# Patient Record
Sex: FEMALE | Race: WHITE | NOT HISPANIC OR LATINO | Employment: FULL TIME | URBAN - METROPOLITAN AREA
[De-identification: names, ages, dates, MRNs, and addresses within clinical notes are randomized per-mention and may not be internally consistent; named-entity substitution may affect disease eponyms.]

---

## 2017-07-13 ENCOUNTER — OFFICE VISIT (OUTPATIENT)
Dept: OBSTETRICS AND GYNECOLOGY | Facility: CLINIC | Age: 32
End: 2017-07-13

## 2017-07-13 VITALS
BODY MASS INDEX: 28.41 KG/M2 | WEIGHT: 181 LBS | DIASTOLIC BLOOD PRESSURE: 70 MMHG | SYSTOLIC BLOOD PRESSURE: 122 MMHG | HEIGHT: 67 IN

## 2017-07-13 DIAGNOSIS — N92.6 IRREGULAR MENSES: ICD-10-CM

## 2017-07-13 DIAGNOSIS — N39.3 SUI (STRESS URINARY INCONTINENCE, FEMALE): ICD-10-CM

## 2017-07-13 DIAGNOSIS — N94.10 DYSPAREUNIA, FEMALE: ICD-10-CM

## 2017-07-13 DIAGNOSIS — Z01.419 WOMEN'S ANNUAL ROUTINE GYNECOLOGICAL EXAMINATION: Primary | ICD-10-CM

## 2017-07-13 PROBLEM — F17.210 LIGHT SMOKER: Status: ACTIVE | Noted: 2017-07-13

## 2017-07-13 PROBLEM — Z30.2 ENCOUNTER FOR ESSURE IMPLANTATION: Status: ACTIVE | Noted: 2017-07-13

## 2017-07-13 PROCEDURE — 99395 PREV VISIT EST AGE 18-39: CPT | Performed by: OBSTETRICS & GYNECOLOGY

## 2017-07-13 NOTE — PROGRESS NOTES
"Subjective   Chief Complaint   Patient presents with   • Gynecologic Exam     no sex drive   • Genital Warts     3   • Bladder Prolapse     Meagan Davis is a 32 y.o. year old  presenting to be seen for her annual exam.    Current birth control method: Essure. Also decreased libido but it hurts    Patient's last menstrual period was 2017.    She is sexually active.   Condoms are not typically used.  Pain with sex - midline and she feels something ? Bladder dropping - she also has TACHO since last child, was a \"tinkle\" now gush with cough laugh or sneeze. NO Kegel's    Past 6 month menstrual history:    Cycle Frequency: vary between 25 and 28 days   Menstrual cycle character: flow is typically moderately heavy   Cycle Duration: 4 - 5   Number of heavy days of flows: 4   Intermenstrual bleeding present: {yes or early ; if early in month then she bleeds at the kate of the month   Post-coital bleeding present: no     She exercises regularly: no.take scare of Grandmother past year and quit last night due to anxiety palpations  Calcium intake: no.  She performs self breast exam:yes.  She has concerns about domestic violence: no.    The following portions of the patient's history were reviewed and updated as appropriate:problem list, current medications, allergies, past family history, past medical history, past social history and past surgical history.    Review of Systems    normal bladder and bowels  Objective   /70  Ht 66.75\" (169.5 cm)  Wt 181 lb (82.1 kg)  LMP 2017 Comment: irreg cycles  BMI 28.56 kg/m2     General:  well developed; well nourished  no acute distress  appears stated age   Skin:  No suspicious lesions seen   Thyroid: not examined   Breasts:  Examined in supine position  Symmetric without masses or skin dimpling  Nipples normal without inversion, lesions or discharge  There are no palpable axillary nodes   Abdomen: soft, non-tender; no masses  no umbilical or inginual hernias " are present  no hepato-splenomegaly  slightly tender lower quadrants   Pelvis: Clinical staff was present for exam  External genitalia:  normal appearance of the external genitalia including Bartholin's and [place]'s glands.  :  urethral meatus normal; urethral hypermobility is absent.  Vaginal:  normal pink mucosa without prolapse or lesions. she is able to perform a Kegel contraction upon request;  Cervix:  normal appearance. Pap test done  Uterus:  normal size, shape and consistency. Some guarding.  Adnexa:  tenderness of the bilateral adnexa to  deep palpation;  Rectal:  digital rectal exam not performed; anus visually normal appearing.     Lab Review   No data reviewed    Imaging  No data reviewed       Assessment   1. Probably regular cycles  2. Dyspareunia and decreased libido associated with pain and possibly 3 children at home.  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  She recently quit due to the stress and anxiety no family history of endometriosis  3. Stress urinary incontinence with strong Kegel response.     Plan   1.  Kegel exercises for 5 minutes and when necessary timed voiding information urethral slings  2.  Consider ultrasound although I don't think there is anything abnormal.  She may have mild pelvic relaxation certainly not anything requiring surgery at this time  3.  Weight on workup of decreased libido given her anxiety stress associated with caring for her grandmother.  I think she needs some time to recover and time for herself.  Discussed that the decreased libido was not unusual after having 3 children.  4.  Return in 3 months to reevaluate bladder and libido issues    Medications Rx this encounter:  No orders of the defined types were placed in this encounter.         This note was electronically signed.    Chris Chavez MD  7/13/2017

## 2017-09-05 ENCOUNTER — OFFICE VISIT (OUTPATIENT)
Dept: OBSTETRICS AND GYNECOLOGY | Facility: CLINIC | Age: 32
End: 2017-09-05

## 2017-09-05 VITALS
SYSTOLIC BLOOD PRESSURE: 118 MMHG | DIASTOLIC BLOOD PRESSURE: 74 MMHG | RESPIRATION RATE: 16 BRPM | WEIGHT: 183 LBS | BODY MASS INDEX: 28.88 KG/M2

## 2017-09-05 DIAGNOSIS — R87.612 LGSIL OF CERVIX OF UNDETERMINED SIGNIFICANCE: Primary | ICD-10-CM

## 2017-09-05 DIAGNOSIS — A63.0 CONDYLOMA: ICD-10-CM

## 2017-09-05 PROCEDURE — 57455 BIOPSY OF CERVIX W/SCOPE: CPT | Performed by: OBSTETRICS & GYNECOLOGY

## 2017-09-05 NOTE — PROGRESS NOTES
Colposcopy    Date of procedure:  9/5/2017    Risks and benefits discussed? Yes; she is concerned somewhat tearful that a mistake she made the past has caused this trouble with her Pap test and external condyloma.  All questions answered? yes  Consents given by the patient  Written consent obtained? yes      Pre-op indication: LGSIL - mild dysplasia  Procedure documentation:    The cervix was viewed colposcopically with and without a green filter.  The cervix was  bathed in acetic acid.   The findings were as follows:      The transformation zone was able to be seen adequately.    Acetowhite noted at 12 o'clock actually distended from about 11:00 to 1:00.  There is a nabothian cyst that 9:00.  Slight's slant to the cervix because of that cyst.  General appearance of prior conization.    Ectocervical biopsies taken from 12 o'clock.  Silver nitrate was applied to the biopsy sites..    An ECC was not performed.    She has external condyloma right upper labia left lower labia and right perineum      Colposcopic Impression: 1. Adequate colposcopy  2. Colposcopic findings are consistent with PAP  3. External condyloma   4. Dyspareunia        Plan: Will base further treatment on pathology results                                                        We will treat external condyloma either with bichloracetic acid or potentially local and pencil cautery to remove lesions.                                                        I need old chart to see what sort of dysplasia she had.  She did is status post conization?  Laser      This note was electronically signed.    Chris Chavez M.D.  September 5, 2017

## 2017-09-13 PROBLEM — Z98.890 S/P CONIZATION OF CERVIX: Status: ACTIVE | Noted: 2017-09-13

## 2018-07-31 ENCOUNTER — TELEPHONE (OUTPATIENT)
Dept: OBSTETRICS AND GYNECOLOGY | Facility: CLINIC | Age: 33
End: 2018-07-31

## 2018-12-05 ENCOUNTER — OFFICE VISIT (OUTPATIENT)
Dept: OBSTETRICS AND GYNECOLOGY | Facility: CLINIC | Age: 33
End: 2018-12-05

## 2018-12-05 VITALS
SYSTOLIC BLOOD PRESSURE: 118 MMHG | RESPIRATION RATE: 16 BRPM | BODY MASS INDEX: 29.51 KG/M2 | DIASTOLIC BLOOD PRESSURE: 70 MMHG | WEIGHT: 187 LBS

## 2018-12-05 DIAGNOSIS — N39.3 SUI (STRESS URINARY INCONTINENCE, FEMALE): ICD-10-CM

## 2018-12-05 DIAGNOSIS — N92.6 IRREGULAR MENSES: Primary | ICD-10-CM

## 2018-12-05 DIAGNOSIS — N94.10 DYSPAREUNIA, FEMALE: ICD-10-CM

## 2018-12-05 DIAGNOSIS — A63.0 CONDYLOMA: ICD-10-CM

## 2018-12-05 DIAGNOSIS — N94.6 DYSMENORRHEA: ICD-10-CM

## 2018-12-05 PROBLEM — Z30.2 ENCOUNTER FOR ESSURE IMPLANTATION: Status: ACTIVE | Noted: 2018-12-05

## 2018-12-05 PROCEDURE — 99213 OFFICE O/P EST LOW 20 MIN: CPT | Performed by: OBSTETRICS & GYNECOLOGY

## 2018-12-05 RX ORDER — ACETAMINOPHEN AND CODEINE PHOSPHATE 120; 12 MG/5ML; MG/5ML
1 SOLUTION ORAL DAILY
Qty: 28 TABLET | Refills: 3 | Status: SHIPPED | OUTPATIENT
Start: 2018-12-05 | End: 2019-01-14

## 2018-12-05 RX ORDER — SODIUM CHLORIDE 0.9 % (FLUSH) 0.9 %
1-10 SYRINGE (ML) INJECTION AS NEEDED
Status: CANCELLED | OUTPATIENT
Start: 2018-12-05

## 2018-12-05 RX ORDER — SODIUM CHLORIDE 0.9 % (FLUSH) 0.9 %
3 SYRINGE (ML) INJECTION EVERY 12 HOURS SCHEDULED
Status: CANCELLED | OUTPATIENT
Start: 2018-12-05

## 2018-12-05 NOTE — PROGRESS NOTES
"Subjective   Chief Complaint   Patient presents with   • Vaginal Bleeding     bleeding all of november, irreg cycles with cramping     Meagan Davis is a 33 y.o. year old .  No LMP recorded (lmp unknown).  She presents to be seen because of irregular menses.  Her periods started  and lasted pretty much all month with an increase around .  Decreased to spotting again daily and now it started up heavy again like another.  Is also terrible cramping with her flow.  She is visibly upset today in the office regarding her flow and cramping and pain.  I reviewed her ultrasound from last year and it showed possible adenomyosis.  She has E sure coils in place for birth control not interested in further childbearing.  Discussed options regarding bleeding such as birth control pills/IUD/ablation/hysterectomy.  She is also having pain with intercourse feels like he is hitting something.  She reports that at laparoscopy laser\" burn\" her ovaries.  Upon review of the chart I don't see this report she says it was when she had her laser done of her cervix for the abnormal Pap smear.  Also had condyloma treated at that time with the laser.  Also has recurrence of condyloma.  These been lasered in the past.  Last time she was here care was out in my substitute nurse could not find BCA so we may need to treat that today as well.  She reports that when she initially didn't do Kegel exercises and these help but now it seems like it's made worse.  I wonder whether she is Valsalva and she thinks she is doing them correctly.                      The following portions of the patient's history were reviewed and updated as appropriate:problem list, current medications and allergies    Review of Systems please see above normal bowel movements     Objective   /70   Resp 16   Wt 84.8 kg (187 lb)   LMP  (LMP Unknown)   BMI 29.51 kg/m²     General:  well developed; well nourished  no acute distress  appears " stated age  mildly distressed   Skin:  No suspicious lesions seen   Thyroid: normal to inspection and palpation   Lungs:  breathing is unlabored   Heart:  Not performed.   Abdomen: no umbilical or inguinal hernias are present  no hepato-splenomegaly  Slightly tender both lower quadrants left greater than right no CVAT   Pelvis: Clinical staff was present for exam  External genitalia:  normal appearance of the external genitalia including Bartholin's and [place]'s glands. Blood present  :  urethral meatus normal; urethra hypermobile; Q-tip elevates 30-45° to call  Vaginal:  she is able to perform a Kegel contraction upon request; blood present -  moderate amount;  Uterus:  normal size, shape and consistency. anteverted; Tender to exam  Adnexa:  normal bimanual exam of the adnexa.  Rectal:  digital rectal exam not performed; anus visually normal appearing.   Addendum there are multiple condyloma a large 1+ centimeter area right upper labia and 2-3 areas of 1 cm condyloma in the perineum.       Lab Review   No data reviewed    Imaging   Pelvic ultrasound report-revealed adenomyosis 2017       Assessment   1. Irregular menses history of possible adenomyosis on ultrasound in 2017  2. Dysmenorrhea see above  3. Dyspareunia-uterus is anterior but tender on deeper palpation  4. Stress urinary incontinence-she is doing a strong Kegel correctly and has hypermobility of the urethra.  Bladder felt empty today so PVR would be minimal.  Nontender I do not think this represents a UTI.  5. Condyloma.  Discussed options for treating I don't know that I would want to treat today given how much bleeding she is doing.  6. Upon chart review she had the laser vaporization of her cervix along with condyloma but no laparoscopy was done at that time.     Plan   1. Consider hysterectomy and salpingectomy.  Combined with TVT cystoscopy.  I have given her Epic information regarding both hysterectomy and mid urethral sling.  Discussed risks,  conditions of both and options in her care.  2. Combined with fulguration of the external condyloma  3. Since she is a smoker; Micronor birth control pills to help control her flow may be twice a day as needed until we can get her surgery scheduled.    Medications Rx this encounter:  No orders of the defined types were placed in this encounter.         This note was electronically signed.    Chris Chavez MD  December 5, 2018     Addendum:    Insurance did not see some of the documentation above.  I did not order urinalysis and will do that today.  On examination in the assessment I mentioned that the bladder felt empty today that was after the patient had voided very soon before her examination.  While this is not an ultrasound examination she relaxes enough that I could tell there was not any significant postvoid residual.  Regarding the cough stress test.  I did not have her fill her bladder to see if she leaves but had her cough and her urethral hypermobility was in the 30-45° range.  We may need to have her come back in with a full bladder and see if we can document stress incontinence in the office.  I think she would benefit from a TVT.  Chris Chavez M.D.  December 11, 2018

## 2018-12-10 ENCOUNTER — PREP FOR SURGERY (OUTPATIENT)
Dept: OTHER | Facility: HOSPITAL | Age: 33
End: 2018-12-10

## 2018-12-10 DIAGNOSIS — N39.3 SUI (STRESS URINARY INCONTINENCE, FEMALE): Primary | ICD-10-CM

## 2018-12-10 DIAGNOSIS — A63.0 CONDYLOMA: Primary | ICD-10-CM

## 2018-12-10 RX ORDER — SODIUM CHLORIDE 0.9 % (FLUSH) 0.9 %
1-10 SYRINGE (ML) INJECTION AS NEEDED
Status: CANCELLED | OUTPATIENT
Start: 2018-12-10

## 2018-12-10 RX ORDER — SODIUM CHLORIDE 0.9 % (FLUSH) 0.9 %
3 SYRINGE (ML) INJECTION EVERY 12 HOURS SCHEDULED
Status: CANCELLED | OUTPATIENT
Start: 2018-12-10

## 2018-12-12 ENCOUNTER — OFFICE VISIT (OUTPATIENT)
Dept: OBSTETRICS AND GYNECOLOGY | Facility: CLINIC | Age: 33
End: 2018-12-12

## 2018-12-12 VITALS
RESPIRATION RATE: 16 BRPM | SYSTOLIC BLOOD PRESSURE: 118 MMHG | DIASTOLIC BLOOD PRESSURE: 70 MMHG | BODY MASS INDEX: 29.51 KG/M2 | WEIGHT: 187 LBS

## 2018-12-12 DIAGNOSIS — N39.3 SUI (STRESS URINARY INCONTINENCE, FEMALE): Primary | ICD-10-CM

## 2018-12-12 PROCEDURE — 99212 OFFICE O/P EST SF 10 MIN: CPT | Performed by: OBSTETRICS & GYNECOLOGY

## 2018-12-12 NOTE — PROGRESS NOTES
Subjective   Chief Complaint   Patient presents with   • Follow-up     Meagan Davis is a 33 y.o. year old .  No LMP recorded (lmp unknown).  She presents to be seen because of problems with stress urinary incontinence.  She had been scheduled for surgery but insurance declined as I had not documented enough.  She returns today for cough stress test, urinalysis and PVR.                                  The following portions of the patient's history were reviewed and updated as appropriate:problem list, current medications and allergies    Review of Systems please see prior notes     Objective   /70   Resp 16   Wt 84.8 kg (187 lb)   LMP  (LMP Unknown)   Breastfeeding? No   BMI 29.51 kg/m²     General:  well developed; well nourished  no acute distress  appears stated age   Skin:  No suspicious lesions seen   Thyroid: normal to inspection and palpation   Lungs:  breathing is unlabored   Heart:  Not performed.   Abdomen: soft, non-tender; no masses  no umbilical or inguinal hernias are present  no hepato-splenomegaly   Pelvis: External genitalia revealed a number of condyloma.    With cough/Valsalva on the second attempt she did leak forceful urine.  There is obvious urethral descent with cough.    She was sent to obtain urinalysis.    She will return to check postvoid residual.       Lab Review   No data reviewed    Imaging   No data reviewed       Assessment   1. Stress urinary incontinence  2. Chronic pelvic pain  3. Symptomatic external condyloma     Plan   1. Check urinalysis and return to check post for residual after bladder emptied  2. Hopefully combine TVT cystoscopy with her scheduled hysterectomy and excision of condyloma.    Medications Rx this encounter:  No orders of the defined types were placed in this encounter.         This note was electronically signed.    Chris Chavez MD  2018     Addendum: Meagan returned after giving urine sample was examined and her bladder feels  empty on bimanual examination.  This would be consistent with a normal postvoid residual.  Chris Chavez

## 2019-01-02 ENCOUNTER — APPOINTMENT (OUTPATIENT)
Dept: PREADMISSION TESTING | Facility: HOSPITAL | Age: 34
End: 2019-01-02

## 2019-01-02 ENCOUNTER — OFFICE VISIT (OUTPATIENT)
Dept: OBSTETRICS AND GYNECOLOGY | Facility: CLINIC | Age: 34
End: 2019-01-02

## 2019-01-02 VITALS
BODY MASS INDEX: 28.41 KG/M2 | HEIGHT: 67 IN | SYSTOLIC BLOOD PRESSURE: 110 MMHG | WEIGHT: 181 LBS | DIASTOLIC BLOOD PRESSURE: 60 MMHG

## 2019-01-02 VITALS — BODY MASS INDEX: 28.33 KG/M2 | WEIGHT: 186.95 LBS | HEIGHT: 68 IN

## 2019-01-02 DIAGNOSIS — N39.3 SUI (STRESS URINARY INCONTINENCE, FEMALE): Primary | ICD-10-CM

## 2019-01-02 DIAGNOSIS — R87.612 LGSIL OF CERVIX OF UNDETERMINED SIGNIFICANCE: ICD-10-CM

## 2019-01-02 DIAGNOSIS — N94.6 DYSMENORRHEA: ICD-10-CM

## 2019-01-02 LAB
BACTERIA UR QL AUTO: ABNORMAL /HPF
BILIRUB UR QL STRIP: NEGATIVE
CLARITY UR: CLEAR
COLOR UR: YELLOW
DEPRECATED RDW RBC AUTO: 41.1 FL (ref 37–54)
ERYTHROCYTE [DISTWIDTH] IN BLOOD BY AUTOMATED COUNT: 12.5 % (ref 11.3–14.5)
GLUCOSE UR STRIP-MCNC: NEGATIVE MG/DL
HCT VFR BLD AUTO: 46.1 % (ref 34.5–44)
HGB BLD-MCNC: 16.2 G/DL (ref 11.5–15.5)
HGB UR QL STRIP.AUTO: ABNORMAL
HYALINE CASTS UR QL AUTO: ABNORMAL /LPF
KETONES UR QL STRIP: NEGATIVE
LEUKOCYTE ESTERASE UR QL STRIP.AUTO: ABNORMAL
MCH RBC QN AUTO: 31.8 PG (ref 27–31)
MCHC RBC AUTO-ENTMCNC: 35.1 G/DL (ref 32–36)
MCV RBC AUTO: 90.6 FL (ref 80–99)
NITRITE UR QL STRIP: NEGATIVE
PH UR STRIP.AUTO: 6.5 [PH] (ref 5–8)
PLATELET # BLD AUTO: 351 10*3/MM3 (ref 150–450)
PMV BLD AUTO: 8.9 FL (ref 6–12)
PROT UR QL STRIP: NEGATIVE
RBC # BLD AUTO: 5.09 10*6/MM3 (ref 3.89–5.14)
RBC # UR: ABNORMAL /HPF
REF LAB TEST METHOD: ABNORMAL
SP GR UR STRIP: 1.01 (ref 1–1.03)
SQUAMOUS #/AREA URNS HPF: ABNORMAL /HPF
UROBILINOGEN UR QL STRIP: ABNORMAL
WBC NRBC COR # BLD: 9.8 10*3/MM3 (ref 3.5–10.8)
WBC UR QL AUTO: ABNORMAL /HPF

## 2019-01-02 PROCEDURE — 36415 COLL VENOUS BLD VENIPUNCTURE: CPT

## 2019-01-02 PROCEDURE — S0260 H&P FOR SURGERY: HCPCS | Performed by: OBSTETRICS & GYNECOLOGY

## 2019-01-02 PROCEDURE — 85027 COMPLETE CBC AUTOMATED: CPT | Performed by: ANESTHESIOLOGY

## 2019-01-02 PROCEDURE — 81001 URINALYSIS AUTO W/SCOPE: CPT | Performed by: OBSTETRICS & GYNECOLOGY

## 2019-01-02 NOTE — H&P (VIEW-ONLY)
Subjective   Meagan Davis is a 33 y.o. year old  who is scheduled  for surgery due to chronic pelvic pain with dyspareunia.  She also has dysmenorrhea.  Irregular and heavy cycles occasionally.  Options had been discussed with the patient.  She desired more definitive therapy.  Discussed hysterectomy.  She is status post Essure tubal implants for sterilization.  She understood risk complications planned outcome and reasons for proceeding.  We will preserve her ovaries if at all possible at her young age.  Remove any tubal segments if they remain.  Discussed the dyspareunia may persist despite the hysterectomy.  She also has issues with stress urinary incontinence.  I demonstrated leakage with cough in the office.  Had a negative urinalysis revealing a UTI.  She had positive Q-tip test.  This is interfering with activities of daily living and she would like this corrected as well.  Discussed tension-free vaginal taping and associated cystoscopy.  She understands the possible need for intermittent self-catheterization.  She is agreeable to proceed.  She also has external condyloma that have failed to respond to conservative medical management in the office.  She will like these treated at same time.  She has history of mild dysplasia last Pap test with colposcopically directed biopsies revealing same.    History reviewed. No pertinent past medical history.  Past Surgical History:   Procedure Laterality Date   • ESSURE TUBAL LIGATION       Social History     Socioeconomic History   • Marital status:      Spouse name: Not on file   • Number of children: Not on file   • Years of education: Not on file   • Highest education level: Not on file   Tobacco Use   • Smoking status: Current Every Day Smoker     Packs/day: 1.50   • Smokeless tobacco: Current User   • Tobacco comment: up to 2 ppd   Substance and Sexual Activity   • Alcohol use: Yes     Comment: rarely   • Drug use: No   • Sexual activity: Yes  "      Current Outpatient Medications:   •  norethindrone (MICRONOR) 0.35 MG tablet, Take 1 tablet by mouth Daily., Disp: 28 tablet, Rfl: 3    Allergies   Allergen Reactions   • Penicillins Hives     Social History    Tobacco Use      Smoking status: Current Every Day Smoker        Packs/day: 1.50      Smokeless tobacco: Current User      Tobacco comment: up to 2 ppd    Review of Systems please see above      Objective   /60   Ht 170.8 cm (67.25\")   Wt 82.1 kg (181 lb)   LMP 12/27/2018   BMI 28.14 kg/m²   General: well developed; well nourished  no acute distress  appears stated age   Heart: regular rate and rhythm   Lungs: breathing is unlabored  clear to auscultation bilaterally   Abdomen: soft, non-tender; no masses  no umbilical or inguinal hernias are present  no hepato-splenomegaly   Pelvis:: Not performed.  And past uterus is been anterior tender normal multiparous size         Assessment   1. Chronic pelvic pain with dysmenorrhea and dyspareunia some heavy cycle days.  2. Status post Essure sterilization  3. History of mild dysplasia  4. History stress urinary incontinence documented in the office with positive Q-tip test negative urinalysis desires treatment at same surgery as hysterectomy.  5. Symptomatic external condyloma     Plan   1. Robotic-assisted: Laparoscopic hysterectomy with bilateral salpingectomies treatment of any endometriosis or adhesions seen.  Planned ovarian preservation  2. Tension-free vaginal taping with associated cystoscopy  3. Fulguration of symptomatic external condyloma  4. Discussed same day discharge as long she is able to void.  We'll need to teach patient on intermittent self-catheterization.        Chris Chavez M.D.  1/2/2019              "

## 2019-01-02 NOTE — DISCHARGE INSTRUCTIONS

## 2019-01-03 ENCOUNTER — ANESTHESIA EVENT (OUTPATIENT)
Dept: PERIOP | Facility: HOSPITAL | Age: 34
End: 2019-01-03

## 2019-01-03 ENCOUNTER — HOSPITAL ENCOUNTER (OUTPATIENT)
Facility: HOSPITAL | Age: 34
Discharge: HOME | End: 2019-01-04
Attending: OBSTETRICS & GYNECOLOGY | Admitting: OBSTETRICS & GYNECOLOGY

## 2019-01-03 ENCOUNTER — ANESTHESIA (OUTPATIENT)
Dept: PERIOP | Facility: HOSPITAL | Age: 34
End: 2019-01-03

## 2019-01-03 DIAGNOSIS — N94.10 DYSPAREUNIA, FEMALE: ICD-10-CM

## 2019-01-03 DIAGNOSIS — N39.3 SUI (STRESS URINARY INCONTINENCE, FEMALE): ICD-10-CM

## 2019-01-03 DIAGNOSIS — N94.6 DYSMENORRHEA: ICD-10-CM

## 2019-01-03 PROBLEM — Z98.890 S/P CONIZATION OF CERVIX: Status: RESOLVED | Noted: 2017-09-13 | Resolved: 2019-01-03

## 2019-01-03 PROBLEM — A63.0 CONDYLOMA: Status: ACTIVE | Noted: 2019-01-03

## 2019-01-03 LAB
B-HCG UR QL: NEGATIVE
INTERNAL NEGATIVE CONTROL: NEGATIVE
INTERNAL POSITIVE CONTROL: POSITIVE
Lab: NORMAL

## 2019-01-03 PROCEDURE — 25010000002 FENTANYL CITRATE (PF) 100 MCG/2ML SOLUTION: Performed by: NURSE ANESTHETIST, CERTIFIED REGISTERED

## 2019-01-03 PROCEDURE — 25010000002 KETOROLAC TROMETHAMINE PER 15 MG: Performed by: NURSE ANESTHETIST, CERTIFIED REGISTERED

## 2019-01-03 PROCEDURE — 58571 TLH W/T/O 250 G OR LESS: CPT | Performed by: OBSTETRICS & GYNECOLOGY

## 2019-01-03 PROCEDURE — 88307 TISSUE EXAM BY PATHOLOGIST: CPT | Performed by: OBSTETRICS & GYNECOLOGY

## 2019-01-03 PROCEDURE — 81025 URINE PREGNANCY TEST: CPT | Performed by: ANESTHESIOLOGY

## 2019-01-03 PROCEDURE — 25010000002 DEXAMETHASONE PER 1 MG: Performed by: NURSE ANESTHETIST, CERTIFIED REGISTERED

## 2019-01-03 PROCEDURE — 25010000002 HYDROMORPHONE PER 4 MG: Performed by: NURSE ANESTHETIST, CERTIFIED REGISTERED

## 2019-01-03 PROCEDURE — 25010000002 GENTAMICIN PER 80 MG: Performed by: OBSTETRICS & GYNECOLOGY

## 2019-01-03 PROCEDURE — 57288 REPAIR BLADDER DEFECT: CPT | Performed by: OBSTETRICS & GYNECOLOGY

## 2019-01-03 PROCEDURE — 25010000002 PROMETHAZINE PER 50 MG: Performed by: OBSTETRICS & GYNECOLOGY

## 2019-01-03 PROCEDURE — 25010000002 KETOROLAC TROMETHAMINE PER 15 MG: Performed by: OBSTETRICS & GYNECOLOGY

## 2019-01-03 PROCEDURE — 25010000002 PROPOFOL 10 MG/ML EMULSION: Performed by: NURSE ANESTHETIST, CERTIFIED REGISTERED

## 2019-01-03 PROCEDURE — 25010000002 MIDAZOLAM PER 1 MG: Performed by: ANESTHESIOLOGY

## 2019-01-03 PROCEDURE — 56501 DESTROY VULVA LESIONS SIM: CPT | Performed by: OBSTETRICS & GYNECOLOGY

## 2019-01-03 PROCEDURE — 25010000002 ONDANSETRON PER 1 MG: Performed by: NURSE ANESTHETIST, CERTIFIED REGISTERED

## 2019-01-03 PROCEDURE — 25010000002 NEOSTIGMINE 10 MG/10ML SOLUTION: Performed by: NURSE ANESTHETIST, CERTIFIED REGISTERED

## 2019-01-03 PROCEDURE — C1771 REP DEV, URINARY, W/SLING: HCPCS | Performed by: OBSTETRICS & GYNECOLOGY

## 2019-01-03 DEVICE — SLNG TVT EXACT CONTINENCE SYS BX/1EA: Type: IMPLANTABLE DEVICE | Status: FUNCTIONAL

## 2019-01-03 DEVICE — SEALANT FIBRIN TISSEEL FZ 4ML: Type: IMPLANTABLE DEVICE | Status: FUNCTIONAL

## 2019-01-03 RX ORDER — PROMETHAZINE HYDROCHLORIDE 25 MG/ML
12.5 INJECTION, SOLUTION INTRAMUSCULAR; INTRAVENOUS EVERY 4 HOURS PRN
Status: DISCONTINUED | OUTPATIENT
Start: 2019-01-03 | End: 2019-01-04 | Stop reason: HOSPADM

## 2019-01-03 RX ORDER — ONDANSETRON 4 MG/1
4 TABLET, FILM COATED ORAL EVERY 6 HOURS PRN
Status: DISCONTINUED | OUTPATIENT
Start: 2019-01-03 | End: 2019-01-04 | Stop reason: HOSPADM

## 2019-01-03 RX ORDER — PROMETHAZINE HYDROCHLORIDE 25 MG/ML
6.25 INJECTION, SOLUTION INTRAMUSCULAR; INTRAVENOUS ONCE AS NEEDED
Status: DISCONTINUED | OUTPATIENT
Start: 2019-01-03 | End: 2019-01-03 | Stop reason: HOSPADM

## 2019-01-03 RX ORDER — PROMETHAZINE HYDROCHLORIDE 12.5 MG/1
12.5 SUPPOSITORY RECTAL EVERY 4 HOURS PRN
Status: DISCONTINUED | OUTPATIENT
Start: 2019-01-03 | End: 2019-01-04 | Stop reason: HOSPADM

## 2019-01-03 RX ORDER — IBUPROFEN 600 MG/1
600 TABLET ORAL EVERY 6 HOURS PRN
Qty: 30 TABLET | Refills: 1 | Status: SHIPPED | OUTPATIENT
Start: 2019-01-03 | End: 2022-09-27

## 2019-01-03 RX ORDER — BUPIVACAINE HYDROCHLORIDE AND EPINEPHRINE 5; 5 MG/ML; UG/ML
INJECTION, SOLUTION PERINEURAL AS NEEDED
Status: DISCONTINUED | OUTPATIENT
Start: 2019-01-03 | End: 2019-01-03 | Stop reason: HOSPADM

## 2019-01-03 RX ORDER — PROMETHAZINE HYDROCHLORIDE 25 MG/1
25 TABLET ORAL EVERY 4 HOURS PRN
Status: DISCONTINUED | OUTPATIENT
Start: 2019-01-03 | End: 2019-01-04 | Stop reason: HOSPADM

## 2019-01-03 RX ORDER — HYDROCODONE BITARTRATE AND ACETAMINOPHEN 5; 325 MG/1; MG/1
1 TABLET ORAL EVERY 6 HOURS PRN
Qty: 15 TABLET | Refills: 0 | Status: SHIPPED | OUTPATIENT
Start: 2019-01-03 | End: 2019-01-22

## 2019-01-03 RX ORDER — SODIUM CHLORIDE, SODIUM LACTATE, POTASSIUM CHLORIDE, CALCIUM CHLORIDE 600; 310; 30; 20 MG/100ML; MG/100ML; MG/100ML; MG/100ML
INJECTION, SOLUTION INTRAVENOUS CONTINUOUS PRN
Status: DISCONTINUED | OUTPATIENT
Start: 2019-01-03 | End: 2019-01-03 | Stop reason: SURG

## 2019-01-03 RX ORDER — PROMETHAZINE HYDROCHLORIDE 25 MG/1
25 TABLET ORAL ONCE AS NEEDED
Status: DISCONTINUED | OUTPATIENT
Start: 2019-01-03 | End: 2019-01-03 | Stop reason: HOSPADM

## 2019-01-03 RX ORDER — PROMETHAZINE HYDROCHLORIDE 25 MG/ML
25 INJECTION, SOLUTION INTRAMUSCULAR; INTRAVENOUS EVERY 4 HOURS PRN
Status: DISCONTINUED | OUTPATIENT
Start: 2019-01-03 | End: 2019-01-04 | Stop reason: HOSPADM

## 2019-01-03 RX ORDER — SIMETHICONE 80 MG
80 TABLET,CHEWABLE ORAL 4 TIMES DAILY PRN
Status: DISCONTINUED | OUTPATIENT
Start: 2019-01-03 | End: 2019-01-04 | Stop reason: HOSPADM

## 2019-01-03 RX ORDER — MAGNESIUM HYDROXIDE 1200 MG/15ML
LIQUID ORAL AS NEEDED
Status: DISCONTINUED | OUTPATIENT
Start: 2019-01-03 | End: 2019-01-03 | Stop reason: HOSPADM

## 2019-01-03 RX ORDER — SODIUM CHLORIDE 0.9 % (FLUSH) 0.9 %
3 SYRINGE (ML) INJECTION EVERY 12 HOURS SCHEDULED
Status: DISCONTINUED | OUTPATIENT
Start: 2019-01-03 | End: 2019-01-03 | Stop reason: HOSPADM

## 2019-01-03 RX ORDER — ACETAMINOPHEN 650 MG/1
650 SUPPOSITORY RECTAL EVERY 4 HOURS PRN
Status: DISCONTINUED | OUTPATIENT
Start: 2019-01-03 | End: 2019-01-04 | Stop reason: HOSPADM

## 2019-01-03 RX ORDER — CLINDAMYCIN PHOSPHATE 900 MG/50ML
900 INJECTION INTRAVENOUS ONCE
Status: COMPLETED | OUTPATIENT
Start: 2019-01-03 | End: 2019-01-03

## 2019-01-03 RX ORDER — ONDANSETRON 2 MG/ML
INJECTION INTRAMUSCULAR; INTRAVENOUS AS NEEDED
Status: DISCONTINUED | OUTPATIENT
Start: 2019-01-03 | End: 2019-01-03 | Stop reason: SURG

## 2019-01-03 RX ORDER — FAMOTIDINE 20 MG/1
20 TABLET, FILM COATED ORAL ONCE
Status: COMPLETED | OUTPATIENT
Start: 2019-01-03 | End: 2019-01-03

## 2019-01-03 RX ORDER — KETOROLAC TROMETHAMINE 30 MG/ML
30 INJECTION, SOLUTION INTRAMUSCULAR; INTRAVENOUS EVERY 6 HOURS PRN
Status: DISCONTINUED | OUTPATIENT
Start: 2019-01-03 | End: 2019-01-04 | Stop reason: HOSPADM

## 2019-01-03 RX ORDER — PROPOFOL 10 MG/ML
VIAL (ML) INTRAVENOUS CONTINUOUS PRN
Status: DISCONTINUED | OUTPATIENT
Start: 2019-01-03 | End: 2019-01-03 | Stop reason: SURG

## 2019-01-03 RX ORDER — FENTANYL CITRATE 50 UG/ML
INJECTION, SOLUTION INTRAMUSCULAR; INTRAVENOUS AS NEEDED
Status: DISCONTINUED | OUTPATIENT
Start: 2019-01-03 | End: 2019-01-03 | Stop reason: SURG

## 2019-01-03 RX ORDER — NEOSTIGMINE METHYLSULFATE 1 MG/ML
INJECTION, SOLUTION INTRAVENOUS AS NEEDED
Status: DISCONTINUED | OUTPATIENT
Start: 2019-01-03 | End: 2019-01-03 | Stop reason: SURG

## 2019-01-03 RX ORDER — SODIUM CHLORIDE 0.9 % (FLUSH) 0.9 %
3-10 SYRINGE (ML) INJECTION AS NEEDED
Status: DISCONTINUED | OUTPATIENT
Start: 2019-01-03 | End: 2019-01-03

## 2019-01-03 RX ORDER — SODIUM CHLORIDE 9 MG/ML
INJECTION, SOLUTION INTRAVENOUS AS NEEDED
Status: DISCONTINUED | OUTPATIENT
Start: 2019-01-03 | End: 2019-01-03 | Stop reason: HOSPADM

## 2019-01-03 RX ORDER — GLYCOPYRROLATE 0.2 MG/ML
INJECTION INTRAMUSCULAR; INTRAVENOUS AS NEEDED
Status: DISCONTINUED | OUTPATIENT
Start: 2019-01-03 | End: 2019-01-03 | Stop reason: SURG

## 2019-01-03 RX ORDER — MIDAZOLAM HYDROCHLORIDE 1 MG/ML
2 INJECTION INTRAMUSCULAR; INTRAVENOUS ONCE
Status: COMPLETED | OUTPATIENT
Start: 2019-01-03 | End: 2019-01-03

## 2019-01-03 RX ORDER — SODIUM CHLORIDE 0.9 % (FLUSH) 0.9 %
3 SYRINGE (ML) INJECTION EVERY 12 HOURS SCHEDULED
Status: DISCONTINUED | OUTPATIENT
Start: 2019-01-03 | End: 2019-01-03

## 2019-01-03 RX ORDER — PROPOFOL 10 MG/ML
VIAL (ML) INTRAVENOUS AS NEEDED
Status: DISCONTINUED | OUTPATIENT
Start: 2019-01-03 | End: 2019-01-03 | Stop reason: SURG

## 2019-01-03 RX ORDER — ROCURONIUM BROMIDE 10 MG/ML
INJECTION, SOLUTION INTRAVENOUS AS NEEDED
Status: DISCONTINUED | OUTPATIENT
Start: 2019-01-03 | End: 2019-01-03 | Stop reason: SURG

## 2019-01-03 RX ORDER — SODIUM CHLORIDE, SODIUM LACTATE, POTASSIUM CHLORIDE, CALCIUM CHLORIDE 600; 310; 30; 20 MG/100ML; MG/100ML; MG/100ML; MG/100ML
9 INJECTION, SOLUTION INTRAVENOUS CONTINUOUS
Status: DISCONTINUED | OUTPATIENT
Start: 2019-01-03 | End: 2019-01-04 | Stop reason: HOSPADM

## 2019-01-03 RX ORDER — LIDOCAINE HYDROCHLORIDE 10 MG/ML
0.5 INJECTION, SOLUTION EPIDURAL; INFILTRATION; INTRACAUDAL; PERINEURAL ONCE AS NEEDED
Status: DISCONTINUED | OUTPATIENT
Start: 2019-01-03 | End: 2019-01-03 | Stop reason: HOSPADM

## 2019-01-03 RX ORDER — IBUPROFEN 600 MG/1
600 TABLET ORAL EVERY 4 HOURS PRN
Status: DISCONTINUED | OUTPATIENT
Start: 2019-01-03 | End: 2019-01-04 | Stop reason: HOSPADM

## 2019-01-03 RX ORDER — ONDANSETRON 2 MG/ML
4 INJECTION INTRAMUSCULAR; INTRAVENOUS EVERY 6 HOURS PRN
Status: DISCONTINUED | OUTPATIENT
Start: 2019-01-03 | End: 2019-01-04 | Stop reason: HOSPADM

## 2019-01-03 RX ORDER — HYDROCODONE BITARTRATE AND ACETAMINOPHEN 5; 325 MG/1; MG/1
1 TABLET ORAL EVERY 4 HOURS PRN
Status: DISCONTINUED | OUTPATIENT
Start: 2019-01-03 | End: 2019-01-04 | Stop reason: HOSPADM

## 2019-01-03 RX ORDER — FAMOTIDINE 10 MG/ML
20 INJECTION, SOLUTION INTRAVENOUS ONCE
Status: DISCONTINUED | OUTPATIENT
Start: 2019-01-03 | End: 2019-01-03 | Stop reason: HOSPADM

## 2019-01-03 RX ORDER — MEPERIDINE HYDROCHLORIDE 25 MG/ML
12.5 INJECTION INTRAMUSCULAR; INTRAVENOUS; SUBCUTANEOUS
Status: DISCONTINUED | OUTPATIENT
Start: 2019-01-03 | End: 2019-01-03 | Stop reason: HOSPADM

## 2019-01-03 RX ORDER — SODIUM CHLORIDE 0.9 % (FLUSH) 0.9 %
1-10 SYRINGE (ML) INJECTION AS NEEDED
Status: DISCONTINUED | OUTPATIENT
Start: 2019-01-03 | End: 2019-01-03 | Stop reason: HOSPADM

## 2019-01-03 RX ORDER — SCOLOPAMINE TRANSDERMAL SYSTEM 1 MG/1
1 PATCH, EXTENDED RELEASE TRANSDERMAL ONCE
Status: DISCONTINUED | OUTPATIENT
Start: 2019-01-03 | End: 2019-01-04 | Stop reason: HOSPADM

## 2019-01-03 RX ORDER — ONDANSETRON 2 MG/ML
4 INJECTION INTRAMUSCULAR; INTRAVENOUS ONCE AS NEEDED
Status: DISCONTINUED | OUTPATIENT
Start: 2019-01-03 | End: 2019-01-03 | Stop reason: HOSPADM

## 2019-01-03 RX ORDER — ACETAMINOPHEN 325 MG/1
650 TABLET ORAL EVERY 4 HOURS PRN
Status: DISCONTINUED | OUTPATIENT
Start: 2019-01-03 | End: 2019-01-04 | Stop reason: HOSPADM

## 2019-01-03 RX ORDER — BUPIVACAINE HYDROCHLORIDE AND EPINEPHRINE 2.5; 5 MG/ML; UG/ML
INJECTION, SOLUTION INFILTRATION; PERINEURAL AS NEEDED
Status: DISCONTINUED | OUTPATIENT
Start: 2019-01-03 | End: 2019-01-03 | Stop reason: HOSPADM

## 2019-01-03 RX ORDER — DEXAMETHASONE SODIUM PHOSPHATE 4 MG/ML
INJECTION, SOLUTION INTRA-ARTICULAR; INTRALESIONAL; INTRAMUSCULAR; INTRAVENOUS; SOFT TISSUE AS NEEDED
Status: DISCONTINUED | OUTPATIENT
Start: 2019-01-03 | End: 2019-01-03 | Stop reason: SURG

## 2019-01-03 RX ORDER — HYDROMORPHONE HYDROCHLORIDE 1 MG/ML
0.5 INJECTION, SOLUTION INTRAMUSCULAR; INTRAVENOUS; SUBCUTANEOUS
Status: DISCONTINUED | OUTPATIENT
Start: 2019-01-03 | End: 2019-01-03 | Stop reason: HOSPADM

## 2019-01-03 RX ORDER — ACETAMINOPHEN 160 MG/5ML
650 SOLUTION ORAL EVERY 4 HOURS PRN
Status: DISCONTINUED | OUTPATIENT
Start: 2019-01-03 | End: 2019-01-04 | Stop reason: HOSPADM

## 2019-01-03 RX ORDER — PROMETHAZINE HYDROCHLORIDE 25 MG/1
25 SUPPOSITORY RECTAL ONCE AS NEEDED
Status: DISCONTINUED | OUTPATIENT
Start: 2019-01-03 | End: 2019-01-03 | Stop reason: HOSPADM

## 2019-01-03 RX ORDER — FENTANYL CITRATE 50 UG/ML
50 INJECTION, SOLUTION INTRAMUSCULAR; INTRAVENOUS
Status: DISCONTINUED | OUTPATIENT
Start: 2019-01-03 | End: 2019-01-03 | Stop reason: HOSPADM

## 2019-01-03 RX ORDER — LIDOCAINE HYDROCHLORIDE 10 MG/ML
INJECTION, SOLUTION EPIDURAL; INFILTRATION; INTRACAUDAL; PERINEURAL AS NEEDED
Status: DISCONTINUED | OUTPATIENT
Start: 2019-01-03 | End: 2019-01-03 | Stop reason: SURG

## 2019-01-03 RX ORDER — ZOLPIDEM TARTRATE 5 MG/1
5 TABLET ORAL NIGHTLY PRN
Status: DISCONTINUED | OUTPATIENT
Start: 2019-01-03 | End: 2019-01-04 | Stop reason: HOSPADM

## 2019-01-03 RX ORDER — KETOROLAC TROMETHAMINE 30 MG/ML
INJECTION, SOLUTION INTRAMUSCULAR; INTRAVENOUS AS NEEDED
Status: DISCONTINUED | OUTPATIENT
Start: 2019-01-03 | End: 2019-01-03 | Stop reason: SURG

## 2019-01-03 RX ADMIN — MIDAZOLAM HYDROCHLORIDE 2 MG: 1 INJECTION, SOLUTION INTRAMUSCULAR; INTRAVENOUS at 08:48

## 2019-01-03 RX ADMIN — FENTANYL CITRATE 50 MCG: 50 INJECTION, SOLUTION INTRAMUSCULAR; INTRAVENOUS at 11:52

## 2019-01-03 RX ADMIN — FENTANYL CITRATE 50 MCG: 50 INJECTION, SOLUTION INTRAMUSCULAR; INTRAVENOUS at 11:29

## 2019-01-03 RX ADMIN — HYDROCODONE BITARTRATE AND ACETAMINOPHEN 1 TABLET: 5; 325 TABLET ORAL at 20:31

## 2019-01-03 RX ADMIN — ONDANSETRON 4 MG: 4 TABLET, FILM COATED ORAL at 20:31

## 2019-01-03 RX ADMIN — ROCURONIUM BROMIDE 40 MG: 10 SOLUTION INTRAVENOUS at 10:22

## 2019-01-03 RX ADMIN — DEXAMETHASONE SODIUM PHOSPHATE 8 MG: 4 INJECTION, SOLUTION INTRAMUSCULAR; INTRAVENOUS at 10:32

## 2019-01-03 RX ADMIN — NEOSTIGMINE METHYLSULFATE 2 MG: 1 INJECTION, SOLUTION INTRAVENOUS at 11:49

## 2019-01-03 RX ADMIN — CLINDAMYCIN PHOSPHATE 900 MG: 18 INJECTION, SOLUTION INTRAVENOUS at 08:55

## 2019-01-03 RX ADMIN — LIDOCAINE HYDROCHLORIDE 50 MG: 10 INJECTION, SOLUTION EPIDURAL; INFILTRATION; INTRACAUDAL; PERINEURAL at 10:22

## 2019-01-03 RX ADMIN — HYDROMORPHONE HYDROCHLORIDE 0.5 MG: 1 INJECTION, SOLUTION INTRAMUSCULAR; INTRAVENOUS; SUBCUTANEOUS at 13:00

## 2019-01-03 RX ADMIN — SODIUM CHLORIDE, POTASSIUM CHLORIDE, SODIUM LACTATE AND CALCIUM CHLORIDE: 600; 310; 30; 20 INJECTION, SOLUTION INTRAVENOUS at 10:15

## 2019-01-03 RX ADMIN — GLYCOPYRROLATE 0.4 MG: 0.2 INJECTION, SOLUTION INTRAMUSCULAR; INTRAVENOUS at 11:49

## 2019-01-03 RX ADMIN — ROCURONIUM BROMIDE 10 MG: 10 SOLUTION INTRAVENOUS at 11:29

## 2019-01-03 RX ADMIN — ONDANSETRON 4 MG: 2 INJECTION INTRAMUSCULAR; INTRAVENOUS at 12:19

## 2019-01-03 RX ADMIN — PROPOFOL 25 MCG/KG/MIN: 10 INJECTION, EMULSION INTRAVENOUS at 10:30

## 2019-01-03 RX ADMIN — PROPOFOL 150 MG: 10 INJECTION, EMULSION INTRAVENOUS at 10:22

## 2019-01-03 RX ADMIN — SCOPALAMINE 1 PATCH: 1 PATCH, EXTENDED RELEASE TRANSDERMAL at 08:52

## 2019-01-03 RX ADMIN — KETOROLAC TROMETHAMINE 30 MG: 30 INJECTION, SOLUTION INTRAMUSCULAR at 12:19

## 2019-01-03 RX ADMIN — HYDROMORPHONE HYDROCHLORIDE 0.5 MG: 1 INJECTION, SOLUTION INTRAMUSCULAR; INTRAVENOUS; SUBCUTANEOUS at 12:40

## 2019-01-03 RX ADMIN — PROMETHAZINE HYDROCHLORIDE 12.5 MG: 25 INJECTION INTRAMUSCULAR; INTRAVENOUS at 22:42

## 2019-01-03 RX ADMIN — GENTAMICIN SULFATE 424 MG: 40 INJECTION, SOLUTION INTRAMUSCULAR; INTRAVENOUS at 10:29

## 2019-01-03 RX ADMIN — IBUPROFEN 600 MG: 600 TABLET ORAL at 18:13

## 2019-01-03 RX ADMIN — KETOROLAC TROMETHAMINE 30 MG: 30 INJECTION, SOLUTION INTRAMUSCULAR at 14:08

## 2019-01-03 RX ADMIN — SODIUM CHLORIDE, POTASSIUM CHLORIDE, SODIUM LACTATE AND CALCIUM CHLORIDE: 600; 310; 30; 20 INJECTION, SOLUTION INTRAVENOUS at 12:20

## 2019-01-03 RX ADMIN — FENTANYL CITRATE 50 MCG: 50 INJECTION, SOLUTION INTRAMUSCULAR; INTRAVENOUS at 10:22

## 2019-01-03 RX ADMIN — ZOLPIDEM TARTRATE 5 MG: 5 TABLET ORAL at 20:31

## 2019-01-03 RX ADMIN — FENTANYL CITRATE: 50 INJECTION, SOLUTION INTRAMUSCULAR; INTRAVENOUS at 13:24

## 2019-01-03 RX ADMIN — FENTANYL CITRATE 50 MCG: 50 INJECTION, SOLUTION INTRAMUSCULAR; INTRAVENOUS at 10:41

## 2019-01-03 RX ADMIN — FAMOTIDINE 20 MG: 20 TABLET, FILM COATED ORAL at 08:48

## 2019-01-03 RX ADMIN — SODIUM CHLORIDE 500 ML: 9 INJECTION, SOLUTION INTRAVENOUS at 14:08

## 2019-01-03 NOTE — PLAN OF CARE
Problem: Patient Care Overview  Goal: Plan of Care Review  Outcome: Ongoing (interventions implemented as appropriate)   01/03/19 1408 01/03/19 1443   OTHER   Outcome Summary --  VSS. Pain well controlled. Will d/c f/c at 1600 per order. Awaiting first void.  at bedside. Incisions CDI. Bolus given per order. Denies further needs. Anticipate d/c tonight or tomorrow am pending voiding ability and tolerating regular food.    Coping/Psychosocial   Plan of Care Reviewed With patient --      Goal: Individualization and Mutuality  Outcome: Ongoing (interventions implemented as appropriate)    Goal: Discharge Needs Assessment  Outcome: Ongoing (interventions implemented as appropriate)    Goal: Interprofessional Rounds/Family Conf  Outcome: Ongoing (interventions implemented as appropriate)      Problem: Surgery Nonspecified (Adult)  Goal: Signs and Symptoms of Listed Potential Problems Will be Absent, Minimized or Managed (Surgery Nonspecified)  Outcome: Ongoing (interventions implemented as appropriate)    Goal: Anesthesia/Sedation Recovery  Outcome: Ongoing (interventions implemented as appropriate)

## 2019-01-03 NOTE — ANESTHESIA PREPROCEDURE EVALUATION
Anesthesia Evaluation                  Airway   Mallampati: I  TM distance: >3 FB  Neck ROM: full  No difficulty expected  Dental      Pulmonary    (+) a smoker,   Cardiovascular         Neuro/Psych  GI/Hepatic/Renal/Endo    (+) obesity,       Musculoskeletal     Abdominal    Substance History      OB/GYN          Other                        Anesthesia Plan    ASA 2     general     intravenous induction   Anesthetic plan, all risks, benefits, and alternatives have been provided, discussed and informed consent has been obtained with: patient.    Plan discussed with CRNA.

## 2019-01-03 NOTE — OP NOTE
Procedure date: January 3, 2019    Preoperative diagnosis: Dysmenorrhea and dyspareunia                                             Stress urinary incontinence                                               Vulvar condyloma    Postoperative diagnosis: Same as above    Procedure: Da Mady-assisted total laparoscopic hysterectomy bilateral salpingectomy                       Tension-free vaginal taping- Exact with associated cystoscopy                        Excision/desiccation vulvar condyloma    General anesthesia    Estimated blood loss: 50 mL    Drains: Griffin to straight drain    Prophylactic antibiotics: Clindamycin and gentamicin    Brief history and indications for procedure: Meagan Davis is a 33 y.o.  has had dysmenorrhea and dyspareunia.  Recent failed conservative medical and surgical management.  She desires more definitive therapy in the form of hysterectomy.  She's had her tubes tied with an Essure in .  She is also bothered by daily stress urinary incontinence.  This has been documented in the office.  She has a positive Q-tip test.  She had a negative urinalysis.  Options were discussed she failed conservative Kegel exercises and desired surgical repair.  Discussed tension-free vaginal taping along with pros and cons and the possibility of increased urinary frequency and urinary retention among other potential complications.  She also has external condyloma that failed chemical treatment.  She would like these removed at the same time.          Procedure: Patient was taken to the operating room and placed in the dorsal lithotomy position.  She was tested for robotic surgery.  She was given prophylactic intravenous antibiotics.  A timeout was taken for the following planned procedure : Da Mady-assisted total laparoscopic hysterectomy with removal of both tubes and possibly ovaries if abnormal.  Tension-free vaginal taping exact and cystoscopy.  Removal of or desiccation of external  condyloma.  She received preoperative clindamycin and gentamicin.    Examination under anesthesia was done to determine uterine size, position and to assess the adnexa.  A Griffin catheter was placed to straight drainage.  The uterus was sounded to 9 centimeters.  The cervix was dilated to allow placement of the appropriate size cannula.  The Medium Lydia ring, vaginal occlusion balloon and cannula were tied down to the cervix with a Vicryl stitch.  The uterine insufflation balloon was inflated.  The vaginal occlusion balloon was inflated with 60 mL's of fluid.    After regloving attention was directed laparoscopically.  0.5% Marcaine was placed at all sites prior to incision and trocar placement.  A supraumbilical incision was made and Optiview trocar inserted.  The abdomen and pelvis were inspected.  After ascertaining that we could proceed, the right and left da Mady trochars and physician assistant port were all placed  under direct visualization.  The bowel was packed out of the pelvis and the robot was brought in for docking.  Once successfully docked , I left for the console.    At the console attention was directed to the left adnexa where the tube was elevated and undermined with PK cautery.  I proceeded with  dissection across the left utero-ovarian ligaments, round ligament and the broad ligament to the level of the cervix and uterine vessels.  These were cauterized prior to being cut.  A bladder flap was partially created.  This was repeated in a similar fashion on the right  side.  The bladder flap was completed and taken down.  After identifying the Lydia ring , monopolar scissors were used to make an incision anteriorly inside the Lydia ring and continued circumferentially.  The posterior Lydia ring was identified and  bipolar scissors were used to dissect down to the interior aspect of the Lydia ring.  Dissection continued circumferentially around the cervix staying inside the Lydia ring to free the uterus  and cervix.  These were removed vaginally.  The pelvis was irrigated and suctioned for blood clots after the vaginal occlusion balloon was replaced.  Hemostasis was acquired with the PK grasper where indicated.  A 2-0 Stratafix was used to close the vaginal cuff from the right angle to the left angle ; 3 bites were taken medially  prior to cutting the suture and removing the needle. The uterosacral ligaments were incorporated in the closure.   With adequate hemostasis achieved, the abdomen was allowed to deflate for 1 minute after the da Mady instruments were removed.  It was reinflated, and with adequate hemostasis achieved, the da Mady was undocked. Tisseel was sprayed across the adnexa and vaginal cuff.   The lateral da Mady trochars and physician assistant port were removed under direct visualization.  After CO2 gas was allowed to escape, the midline trocar was removed.  These incisions were closed with 3-0 plain suture and the skin closed with glue.  The Griffin catheter was left in place..  Sponge and needle counts were correct ×2.  Estimated blood loss was approximately 25 ML.    The condyloma were identified.  Half percent Marcaine was injected beneath these.  Electrocautery was used to excise these.  Touch cautery is used for hemostasis.  Minimal blood loss was encountered.  These were covered with surgical glue.  There was a larger condyloma on the right upper labia and 2 on the right lower posterior labia and perineum.  A number of smaller condyloma were removed as well.    With the Griffin bulb inflated identified the vesicula vaginal junction.  I made an incision vertically in the midline mid urethra.  This was opened up with Metzenbaum scissors and the local was placed 5 ML's on the right and 5 ML's on the left.  I used 5 ML's on the right and left suprapubically 1 finger breadth to the right and left of midline.  The Griffin catheter was removed.  Cystoscope was placed and I was able to visualize the  ureteral orifices bilaterally with urine reflux.  The TVT Exact was opened.  Small hemostat was used in the midline of the mesh over the sheath.  The bladder was drained and the urethra deviated to the right while I passed the TVT trocar on the left side behind the symphysis pubis exiting through the previously made stab incision.  The guide was removed cystoscope was replaced and good placement with no foreign body noted within the bladder was noted.  The bladder was drained.  The guide was used to deviate the urethra to the left while the TVT exact was placed on the right side in a similar fashion exiting through the previously made stab incision.  The cystoscope was used to confirm proper placement.  The tape was then adjusted.  There was approximately 300 mL's of fluid within the bladder.  With pressure only a minimal amount of urine leaked.  At this point I was able to pass a #8 Hegar dilator without difficulty and elevated 45° without strain.  A small hemostat was placed between the urethra and the TVT tape.  The plastic sheaths were grasped simultaneously and removed with the hemostat between the mesh and the urethra.  Cystoscope was placed easily through the urethra.  The Griffin was replaced to straight drainage.  The vaginal incision was closed with a 3-0 Vicryl stitch.  The suprapubic incisions were closed with a 3-0 plain and skin glue.  Sponge and needle counts were correct for this portion of the procedure approximate 25 ML blood loss.    The patient was awakened and taken to postop recovery area in stable condition.      Chris Chavez M.D.

## 2019-01-03 NOTE — ANESTHESIA PROCEDURE NOTES
ANESTHESIA INTUBATION  Urgency: elective      General Information and Staff    Patient location during procedure: OR  CRNA: Balaji Boucher CRNA    Indications and Patient Condition  Indications for airway management: airway protection    Preoxygenated: yes  MILS not maintained throughout  Mask difficulty assessment: 1 - vent by mask    Final Airway Details  Final airway type: endotracheal airway      Successful airway: ETT  Cuffed: yes   Successful intubation technique: direct laryngoscopy  Endotracheal tube insertion site: oral  Blade: Riddle  Blade size: 2  ETT size (mm): 7.0  Cormack-Lehane Classification: grade I - full view of glottis  Placement verified by: chest auscultation and capnometry   Cuff volume (mL): 8  Measured from: lips  ETT to lips (cm): 21  Number of attempts at approach: 1

## 2019-01-03 NOTE — ANESTHESIA POSTPROCEDURE EVALUATION
Patient: Meagan Davis    Procedure Summary     Date:  01/03/19 Room / Location:   [facility] /  [facility]    Anesthesia Start:  1015 Anesthesia Stop:      Procedure:  TOTAL LAPAROSCOPIC HYSTERECTOMY BILATERAL SALPINGECTOMY WITH DAVINCI ROBOT, CYSTO, TVT, EXCISION OF EXTERNAL CONDALOMAS (Bilateral Abdomen) Diagnosis:       TACHO (stress urinary incontinence, female)      Dysmenorrhea      Dyspareunia, female      (TACHO (stress urinary incontinence, female) [N39.3])      (Dysmenorrhea [N94.6])      (Dyspareunia, female [N94.10])    Surgeon:  Chris Chavez MD Provider:  Sidney Etienne MD    Anesthesia Type:  general ASA Status:  2          Anesthesia Type: general  Last vitals  BP   116/76 (01/03/19 1234)   Temp   98.8 °F (37.1 °C) (01/03/19 1234)   Pulse   93 (01/03/19 1234)   Resp   18 (01/03/19 0833)     SpO2   92 % (01/03/19 1234)     Post Anesthesia Care and Evaluation    Patient location during evaluation: PACU  Patient participation: complete - patient participated  Level of consciousness: awake and alert  Pain score: 3  Pain management: inadequate  Airway patency: patent  Anesthetic complications: No anesthetic complications  PONV Status: none  Cardiovascular status: hemodynamically stable and acceptable  Respiratory status: nonlabored ventilation, acceptable and nasal cannula  Hydration status: acceptable    Comments: RN to give fentanyl

## 2019-01-03 NOTE — H&P
[facility] Pre-op    Full history and physical note from office is up to date.  See office note attached.    /96 (BP Location: Right arm, Patient Position: Lying)   Pulse 81   Resp 18   LMP 12/27/2018   SpO2 97%     Cancer Staging (if applicable)  Cancer Patient: __ yes __no __unknown__N/A; If yes, clinical stage T:__ N:__M:__, stage group or __N/A    SUKI Olmedo 1/3/2019 10:02 AM

## 2019-01-03 NOTE — PLAN OF CARE
Problem: Patient Care Overview  Goal: Plan of Care Review  Outcome: Ongoing (interventions implemented as appropriate)    Goal: Individualization and Mutuality  Outcome: Ongoing (interventions implemented as appropriate)    Goal: Discharge Needs Assessment  Outcome: Ongoing (interventions implemented as appropriate)    Goal: Interprofessional Rounds/Family Conf  Outcome: Ongoing (interventions implemented as appropriate)      Problem: Surgery Nonspecified (Adult)  Goal: Signs and Symptoms of Listed Potential Problems Will be Absent, Minimized or Managed (Surgery Nonspecified)  Outcome: Ongoing (interventions implemented as appropriate)    Goal: Anesthesia/Sedation Recovery  Outcome: Ongoing (interventions implemented as appropriate)

## 2019-01-03 NOTE — PROGRESS NOTES
BOBBI Angela Davis  : 1985  MRN: [id number]  CSN: [id number]    Post-operative Day #0  Subjective   Her pain is well controlled. She is passing gas. she was not able to void yet. she is complaining of some suprapubic pain near the incision sites for the TVT.  She was wondering about diet specifically if she can eat chicken nuggets.  She plans to start dieting with [employer]    I discussed that surgery went well regarding the laparoscopic hysterectomy along with the tension-free vaginal taping and excision of the condyloma.  Since she's been unable to void I think it be best to watch her overnight and do bladder training in the morning with planned discharge tomorrow whether she can void on her own or not.  Discussed post for residuals recording voided amounts etc.  Have given information from Epic regarding intermittent catheterization for females.     Objective     Min/max vitals past 24 hours:   Temp  Min: 97.7 °F (36.5 °C)  Max: 98.8 °F (37.1 °C)  BP  Min: 112/67  Max: 123/96  Pulse  Min: 65  Max: 93  Resp  Min: 16  Max: 18        No intake/output data recorded.    General: well developed; well nourished  no acute distress   Abdomen: soft, non-tender; no masses  no umbilical or inguinal hernias are present  no hepato-splenomegaly  incision is clean, dry, intact and without drainage   Pelvic: Not performed   Ext: Calves NT            Assessment   1. Post-op Day #0 S/P T LH BS; TVT exact with cystoscopy; excision/desiccation of external vulvar condyloma  2. Postop urinary retention which is not unexpected patient reassured.     Plan   1. We'll watch overnight and do bladder training tomorrow with discharge plan tomorrow    Chris Chavez MD  1/3/2019  5:30 PM

## 2019-01-03 NOTE — BRIEF OP NOTE
TOTAL LAPAROSCOPIC HYSTERECTOMY WITH DAVINCI ROBOT  Progress Note    Meagan G Ryan  1/3/2019    Pre-op Diagnosis:   TACHO (stress urinary incontinence, female) [N39.3]  Dysmenorrhea [N94.6]  Dyspareunia, female [N94.10]  condylopma       Post-Op Diagnosis Codes:     * TACHO (stress urinary incontinence, female) [N39.3]     * Dysmenorrhea [N94.6]     * Dyspareunia, female [N94.10]        condyloma    Procedure/CPT® Codes:      Procedure(s):  TOTAL LAPAROSCOPIC HYSTERECTOMY BILATERAL SALPINGECTOMY WITH DAVINCI ROBOT, CYSTO, TVT EXACT, EXCISION / dessication OF EXTERNAL CONDYLOMAS    Surgeon(s):  Chris Chavez MD    Anesthesia: General    Staff:   Circulator: Tobias Merino RN; Janeth Boudreaux RN; Trixie Denise RN; Anna Romero RN  Scrub Person: Izabela Pickard; Tiffanie Varela  Nursing Assistant: Marilu Kaur  Assistant: Rasta Godinez PA    Estimated Blood Loss: 50 ML    Urine Voided: * No values recorded between 1/3/2019 10:15 AM and 1/3/2019 12:21 PM *    Specimens:                ID Type Source Tests Collected by Time   A : UTERUS, CERVIX, BILATERAL TUBES Tissue Uterus, Cervix, Bilateral Fallopian Tubes  TISSUE PATHOLOGY EXAM Chris Chavez MD 1/3/2019 1112         Drains:   NG/OG Tube Orogastric 16 Fr Center mouth (Active)       Urethral Catheter 16 Fr. (Active)       Findings: normal anatomy ; external condyloma    Complications: none      Chris Chavez MD     Date: 1/3/2019  Time: 12:30 PM

## 2019-01-04 VITALS
WEIGHT: 186.95 LBS | OXYGEN SATURATION: 96 % | RESPIRATION RATE: 18 BRPM | DIASTOLIC BLOOD PRESSURE: 60 MMHG | HEIGHT: 68 IN | TEMPERATURE: 98.1 F | BODY MASS INDEX: 28.33 KG/M2 | SYSTOLIC BLOOD PRESSURE: 114 MMHG | HEART RATE: 69 BPM

## 2019-01-04 PROBLEM — Z90.710 HISTORY OF ROBOT-ASSISTED LAPAROSCOPIC HYSTERECTOMY: Status: ACTIVE | Noted: 2019-01-04

## 2019-01-04 PROCEDURE — 51701 INSERT BLADDER CATHETER: CPT

## 2019-01-04 RX ORDER — NITROFURANTOIN 25; 75 MG/1; MG/1
100 CAPSULE ORAL NIGHTLY
Qty: 10 CAPSULE | Refills: 0 | Status: SHIPPED | OUTPATIENT
Start: 2019-01-04 | End: 2019-01-14

## 2019-01-04 RX ADMIN — IBUPROFEN 600 MG: 600 TABLET ORAL at 07:40

## 2019-01-04 RX ADMIN — IBUPROFEN 600 MG: 600 TABLET ORAL at 17:01

## 2019-01-04 RX ADMIN — HYDROCODONE BITARTRATE AND ACETAMINOPHEN 1 TABLET: 5; 325 TABLET ORAL at 12:07

## 2019-01-04 RX ADMIN — HYDROCODONE BITARTRATE AND ACETAMINOPHEN 1 TABLET: 5; 325 TABLET ORAL at 07:40

## 2019-01-04 RX ADMIN — HYDROCODONE BITARTRATE AND ACETAMINOPHEN 1 TABLET: 5; 325 TABLET ORAL at 17:01

## 2019-01-04 NOTE — PROGRESS NOTES
BOBBI Miller  Meagan Davis  : 1985  MRN: [id number]  CSN: [id number]    Post-operative Day #1  Subjective   Her pain is well controlled. She is passing gas. her rosas was just removed. discussed bladder training and home today     Objective     Min/max vitals past 24 hours:   Temp  Min: 96.6 °F (35.9 °C)  Max: 98.8 °F (37.1 °C)  BP  Min: 95/54  Max: 123/96  Pulse  Min: 63  Max: 93  Resp  Min: 16  Max: 18        I/O last 3 completed shifts:  In: 1960 [P.O.:960; I.V.:1000]  Out: 2400 [Urine:2400]    General: well developed; well nourished  no acute distress   Abdomen: soft, non-tender; no masses  no umbilical or inguinal hernias are present  no hepato-splenomegaly  incision is clean, dry, intact and without drainage   Pelvic: Not performed   Ext: Calves NT            Assessment   1. Post-op Day #1 S/P TLH,BS; TVT; fulguration of condyloma     Plan   1. bladder training/ I&O self cath  2. Discharge to home later today    Chris Chavez MD  2019  8:10 AM

## 2019-01-04 NOTE — PLAN OF CARE
Problem: Patient Care Overview  Goal: Discharge Needs Assessment  Outcome: Ongoing (interventions implemented as appropriate)    Goal: Interprofessional Rounds/Family Conf  Outcome: Ongoing (interventions implemented as appropriate)      Problem: Surgery Nonspecified (Adult)  Goal: Signs and Symptoms of Listed Potential Problems Will be Absent, Minimized or Managed (Surgery Nonspecified)  Outcome: Ongoing (interventions implemented as appropriate)    Goal: Anesthesia/Sedation Recovery  Outcome: Outcome(s) achieved Date Met: 01/04/19

## 2019-01-04 NOTE — PROGRESS NOTES
Discharge Planning Assessment  [facility]     Patient Name: Meagan Davis  MRN: [id number]  Today's Date: 1/4/2019    Admit Date: 1/3/2019    Discharge Needs Assessment     Row Name 01/04/19 1158       Living Environment    Lives With  spouse;child(jenn), dependent    Name(s) of Who Lives With Patient  spouse is Augustin    Current Living Arrangements  home/apartment/condo one level home    Primary Care Provided by  self    Provides Primary Care For  child(jenn)    Family Caregiver if Needed  spouse    Quality of Family Relationships  supportive    Able to Return to Prior Arrangements  yes       Resource/Environmental Concerns    Resource/Environmental Concerns  none    Transportation Concerns  car, none       Transition Planning    Patient/Family Anticipates Transition to  home with family    Patient/Family Anticipated Services at Transition  none    Transportation Anticipated  family or friend will provide       Discharge Needs Assessment    Readmission Within the Last 30 Days  no previous admission in last 30 days    Concerns to be Addressed  no discharge needs identified    Equipment Currently Used at Home  none    Anticipated Changes Related to Illness  other (see comments) post surgical recuperation    Equipment Needed After Discharge  none        Discharge Plan     Row Name 01/04/19 1205       Plan    Plan  home with family    Patient/Family in Agreement with Plan  yes    Plan Comments  Patient anticipates home with family. No current discharge planning needs identified.     Final Discharge Disposition Code  01 - home or self-care        Destination      No service coordination in this encounter.      Durable Medical Equipment      No service coordination in this encounter.      Dialysis/Infusion      No service coordination in this encounter.      Home Medical Care      No service coordination in this encounter.      Community Resources      No service coordination in this encounter.        Expected  Discharge Date and Time     Expected Discharge Date Expected Discharge Time    Jose 3, 2019         Demographic Summary     Row Name 01/04/19 1157       General Information    Referral Source  admission list    Preferred Language  English       Contact Information    Permission Granted to Share Info With      Contact Information Obtained for      Contact Information Comments  [phone]        Functional Status     Row Name 01/04/19 1157       Functional Status    Usual Activity Tolerance  excellent    Current Activity Tolerance  excellent       Functional Status, IADL    Medications  independent    Meal Preparation  independent    Housekeeping  independent    Laundry  independent    Shopping  independent       Employment/    Employment/ Comments  [employer] is the insurance, no concerns with coverage or obtaining medications        Psychosocial    No documentation.       Abuse/Neglect    No documentation.       Legal    No documentation.       Substance Abuse    No documentation.       Patient Forms    No documentation.           Dana Abdul RN

## 2019-01-04 NOTE — DISCHARGE SUMMARY
BOBBI Miller  Meagan Davis  : 1985  MRN: [id number]  CSN: [id number]    Discharge Summary      Date of Admission: 1/3/2019   Date of Discharge: 2019   Discharge Diagnosis: 1.  Chronic pelvic pain with dysmenorrhea and dyspareunia   2.  Stress urinary incontinence   3.  Symptomatic external condyloma    Procedures Performed: Procedure(s):  TOTAL LAPAROSCOPIC HYSTERECTOMY BILATERAL SALPINGECTOMY WITH DAVINCI ROBOT, CYSTO, TVT, EXCISION OF EXTERNAL CONDYLOMAS      Consults: none   Brief History: Patient is a 33 y.o.who presented with history of chronic pelvic pain consisting of dysmenorrhea and dyspareunia.  This has failed conservative medical management.  She is status post Essure tubal sterilization family was completed.  She desired more definitive therapy.  She is also having issues with daily stress urinary incontinence.  This is interfering with activities of daily living.  Options were discussed decision may proceed with tension-free vaginal taping at the same time as hysterectomy.  She also had some fairly large external condyloma that had failed medical management in the office.  She desired that these be removed as well.  .   Hospital Course: She was admitted for the above procedure and tolerated this well.  She did have issues with postoperative urinary retention.  She was observed overnight and hopefully this will improve.  It did not she was instructed on intermittent self-catheterization.  She felt comfortable doing this at home.  She was given the option of re-anchoring the Griffin for a few more days.  She is also told to use Macrobid at night while she's catheterizing herself.  She be seen back in a proxy 10 days or sooner should she have any difficulties.  We will check an early next week to see how she is doing.  Today's Friday.  We will instructed to call should she have any discomfort or difficulties with her bladder or other postoperative issues.     Pending Studies: Pathology  report   Condition at discharge: gradually improving   Discharge Medications:    Your medication list      START taking these medications      Instructions Last Dose Given Next Dose Due   HYDROcodone-acetaminophen 5-325 MG per tablet  Commonly known as:  NORCO      Take 1 tablet by mouth Every 6 (Six) Hours As Needed for Severe Pain .       ibuprofen 600 MG tablet  Commonly known as:  ADVIL,MOTRIN      Take 1 tablet by mouth Every 6 (Six) Hours As Needed for Moderate Pain .       nitrofurantoin (macrocrystal-monohydrate) 100 MG capsule  Commonly known as:  MACROBID      Take 1 capsule by mouth Every Night for 3 days.          ASK your doctor about these medications      Instructions Last Dose Given Next Dose Due   norethindrone 0.35 MG tablet  Commonly known as:  MICRONOR      Take 1 tablet by mouth Daily.             Where to Get Your Medications      These medications were sent to [facility] - [place] [id number]    Hours:  7:00 AM-5:30 PM M-F, 8:00 AM-4:30 PM Sat-Sun Phone:  [phone] ·   HYDROcodone-acetaminophen 5-325 MG per tablet  · ibuprofen 600 MG tablet  · nitrofurantoin (macrocrystal-monohydrate) 100 MG capsule       She may use OTC Gas-X and/or stool softeners, MiraLAX as needed.     Discharge Disposition: Home  She is instructed on no heavy lifting/driving/groceries/laundry loads for 7-10 days.  Increase activity over next 7-10 days plan back to normal by 2-3 weeks with the exception of pelvic rest until after her 6 week postop checkup.   Follow-up: She be seen back in approximately 10 days in the office sooner should there be any difficulties           This note has been electronically signed.    Chris Chavez MD  January 4, 2019

## 2019-01-07 LAB
CYTO UR: NORMAL
LAB AP CASE REPORT: NORMAL
LAB AP CLINICAL INFORMATION: NORMAL
LAB AP DIAGNOSIS COMMENT: NORMAL
PATH REPORT.FINAL DX SPEC: NORMAL
PATH REPORT.GROSS SPEC: NORMAL

## 2019-01-10 ENCOUNTER — TELEPHONE (OUTPATIENT)
Dept: OBSTETRICS AND GYNECOLOGY | Facility: CLINIC | Age: 34
End: 2019-01-10

## 2019-01-10 RX ORDER — FLUCONAZOLE 150 MG/1
150 TABLET ORAL DAILY
Qty: 1 TABLET | Refills: 0 | Status: SHIPPED | OUTPATIENT
Start: 2019-01-10 | End: 2019-01-14

## 2019-01-10 NOTE — TELEPHONE ENCOUNTER
Please let her know that I called in Diflucan.  We will see her on Monday.  I would encourage her not to use any vaginal over-the-counter yeast medications.  Thank you

## 2019-01-10 NOTE — TELEPHONE ENCOUNTER
Pt is calling and thinks she has a yeast infection - she is itching with minor burning -please call her [phone]

## 2019-01-14 ENCOUNTER — OFFICE VISIT (OUTPATIENT)
Dept: OBSTETRICS AND GYNECOLOGY | Facility: CLINIC | Age: 34
End: 2019-01-14

## 2019-01-14 ENCOUNTER — TELEPHONE (OUTPATIENT)
Dept: OBSTETRICS AND GYNECOLOGY | Facility: CLINIC | Age: 34
End: 2019-01-14

## 2019-01-14 VITALS
WEIGHT: 185 LBS | SYSTOLIC BLOOD PRESSURE: 116 MMHG | DIASTOLIC BLOOD PRESSURE: 70 MMHG | BODY MASS INDEX: 28.53 KG/M2 | RESPIRATION RATE: 16 BRPM

## 2019-01-14 DIAGNOSIS — Z09 SURGERY FOLLOW-UP: Primary | ICD-10-CM

## 2019-01-14 PROBLEM — N92.6 IRREGULAR MENSES: Status: RESOLVED | Noted: 2017-07-13 | Resolved: 2019-01-14

## 2019-01-14 PROBLEM — R33.8 POSTOPERATIVE URINARY RETENTION: Status: ACTIVE | Noted: 2019-01-14

## 2019-01-14 PROBLEM — N99.89 POSTOPERATIVE URINARY RETENTION: Status: ACTIVE | Noted: 2019-01-14

## 2019-01-14 PROBLEM — Z30.2 ENCOUNTER FOR ESSURE IMPLANTATION: Status: RESOLVED | Noted: 2017-07-13 | Resolved: 2019-01-14

## 2019-01-14 PROBLEM — R87.612 LGSIL OF CERVIX OF UNDETERMINED SIGNIFICANCE: Status: RESOLVED | Noted: 2017-09-05 | Resolved: 2019-01-14

## 2019-01-14 PROBLEM — N94.6 DYSMENORRHEA: Status: RESOLVED | Noted: 2018-12-05 | Resolved: 2019-01-14

## 2019-01-14 PROBLEM — A63.0 CONDYLOMA: Status: RESOLVED | Noted: 2017-09-05 | Resolved: 2019-01-14

## 2019-01-14 PROCEDURE — 99024 POSTOP FOLLOW-UP VISIT: CPT | Performed by: OBSTETRICS & GYNECOLOGY

## 2019-01-14 RX ORDER — NITROFURANTOIN 25; 75 MG/1; MG/1
100 CAPSULE ORAL NIGHTLY
Qty: 10 CAPSULE | Refills: 0 | Status: SHIPPED | OUTPATIENT
Start: 2019-01-14 | End: 2019-01-17

## 2019-01-14 NOTE — PROGRESS NOTES
"Subjective   Chief Complaint   Patient presents with   • Post-op     Meagan Davis is a 33 y.o. year old  presenting to be seen for her post-operative visit.  She had a TLH and TVT.  The operative findings were reviewed.  Pathology report and operative pictures were reviewed if appropriate.  Incisions are healing well.  She has minimal pain between the right-sided trocar sites.  Currently she reports pain is well controlled, inability to void and incomplete emptying.  She has been unable to void maybe 100 mL if she moves certain way.  Tries not to strain to void.    Has been cathetering quite a bit because of pain and pressure.    The following portions of the patient's history were reviewed and updated as appropriate:problem list, current medications and allergies    Review of Systems : Please see above     Objective   /70   Resp 16   Wt 83.9 kg (185 lb)   LMP 2018   BMI 28.53 kg/m²     General:  well developed; well nourished  no acute distress  appears stated age   Abdomen: soft, non-tender; no masses  no umbilical or inguinal hernias are present  no hepato-splenomegaly  incision is clean, dry, intact and without drainage   No significant tenderness.     Pelvis: Clinical staff was present for exam  External genitalia:  normal appearance of the external genitalia including Bartholin's and [place]'s glands.  :  urethral meatus normal; urethral hypermobility is absent.  Vaginal:  normal pink mucosa without prolapse or lesions. Minimal amount of blood present  Cervix:  absent. Cuff intact bladder not tender  Uterus:  absent.  Adnexa:  normal bimanual exam of the adnexa.     Lidocaine ointment was used to numb the urethra with each dilator.  #7 Hegar dilator inserted easily.  The urethra appears to be in proper orientation roughly parallel to the floor.  #8 dilator passed and then I did a \"pull down \"for 30 seconds.  I was able to remove the dilator approximate 45°C was some tenderness.  A " #9 dilator was passed for 10 seconds without pull down.            Assessment   1. Urinary retention postop day #11 from TVT along with T LH.  Is doing well regarding T LH with minimal discharge and minimal pain from her incision sites.  2. Complaining of a lot of pressure so she is been cathetering herself quite often.  Hopefully the pull down and dilation to #9 Hegar will help.  Will need to continue Macrobid.  If this is not successful in a couple of days will add medication.  3. 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  She like to go back in a couple of days on Wednesday.  4. I have discouraged her from starting the adnexa care diet today as she is drinking may be more water than she needs to with the urinary retention that would just make her catheter herself more often.  She reports she's substituting [place] by Dr. Reina for which I think is fine.       Plan   I will see her back in 1 week if she is not improved by Wednesday will add medications.  Reassurance that this should improve.  Okay to increase activity level but no heavy lifting.  Okay to drive etc. no intercourse    Medications Rx this encounter:  New Medications Ordered This Visit   Medications   • nitrofurantoin, macrocrystal-monohydrate, (MACROBID) 100 MG capsule     Sig: Take 1 capsule by mouth Every Night for 3 days.     Dispense:  10 capsule     Refill:  0          This note was electronically signed.    Chris Chavez MD  January 14, 2019

## 2019-01-15 ENCOUNTER — TELEPHONE (OUTPATIENT)
Dept: OBSTETRICS AND GYNECOLOGY | Facility: CLINIC | Age: 34
End: 2019-01-15

## 2019-01-15 RX ORDER — PHENAZOPYRIDINE HYDROCHLORIDE 200 MG/1
200 TABLET, FILM COATED ORAL 3 TIMES DAILY PRN
Qty: 9 TABLET | Refills: 0 | Status: SHIPPED | OUTPATIENT
Start: 2019-01-15 | End: 2019-01-22

## 2019-01-15 NOTE — TELEPHONE ENCOUNTER
Patient is voiding since last night on her own.  Urinary burning and pressure today, only self cath times 1 last night.

## 2019-01-22 ENCOUNTER — OFFICE VISIT (OUTPATIENT)
Dept: OBSTETRICS AND GYNECOLOGY | Facility: CLINIC | Age: 34
End: 2019-01-22

## 2019-01-22 VITALS
BODY MASS INDEX: 28.38 KG/M2 | SYSTOLIC BLOOD PRESSURE: 116 MMHG | DIASTOLIC BLOOD PRESSURE: 70 MMHG | RESPIRATION RATE: 16 BRPM | WEIGHT: 184 LBS

## 2019-01-22 DIAGNOSIS — Z90.710 HISTORY OF ROBOT-ASSISTED LAPAROSCOPIC HYSTERECTOMY: ICD-10-CM

## 2019-01-22 DIAGNOSIS — Z09 SURGERY FOLLOW-UP: Primary | ICD-10-CM

## 2019-01-22 PROCEDURE — 99024 POSTOP FOLLOW-UP VISIT: CPT | Performed by: OBSTETRICS & GYNECOLOGY

## 2019-01-22 RX ORDER — NITROFURANTOIN 25; 75 MG/1; MG/1
100 CAPSULE ORAL NIGHTLY
Qty: 14 CAPSULE | Refills: 1 | Status: SHIPPED | OUTPATIENT
Start: 2019-01-22 | End: 2019-01-29

## 2019-01-22 NOTE — PROGRESS NOTES
Subjective   Chief Complaint   Patient presents with   • Post-op     doing better     Meagan Davis is a 33 y.o. year old  presenting to be seen for her post-operative visit.  She had a TVT and  [facility].  The operative findings were reviewed.  Pathology report and operative pictures were reviewed if appropriate.  Currently she reports pain is well controlled, inability to consistently void and slow urinary stream.  The examination last Monday did help with her voiding.  I did a pull down technique along with dilation to #9 Hegar dilator.    Upon review of her voiding diary appears that she is getting up in the middle the night around 1 3 4:00 in the morning.  For instance on the evening of  she catheter self around midnight about 600 and then cath again at 115 600 mL.  I have asked her to really limit her liquids for about 3 or 4 hours before she goes to sleep so that she can hopefully make it through the night without getting up so often.  There may been some confusion when I asked her to wait a bit longer between voids that she was cathing about every hour occasionally without a lot of urine in the bladder she may have been waiting too long and having a great deal of pain which may have interfered with her ability to void.  It looks like she has voided up to 300 mL's without catheterizing herself.    The following portions of the patient's history were reviewed and updated as appropriate:current medications and allergies    Review of Systems : Please see above     Objective   /70   Resp 16   Wt 83.5 kg (184 lb)   LMP 2018   Breastfeeding? No   BMI 28.38 kg/m²     General:  well developed; well nourished  no acute distress  appears stated age   Abdomen: soft, non-tender; no masses  no umbilical or inguinal hernias are present  no hepato-splenomegaly  incision is clean, dry, intact and without drainage   Pelvis: Clinical staff was present for exam  External genitalia:  normal  appearance of the external genitalia including Bartholin's and [place]'s glands.  :  urethral meatus normal; urethral hypermobility is absent. A sterile dilator with lidocaine gel was easily passed into the urethra.  I am able to elevate this about 45 degrees with minimal pain.  A #8 Hegar dilator was then passed and left in place about 20 seconds.  On bimanual examination the incision is healing well nontender  Vaginal:  normal pink mucosa without prolapse or lesions.  Cervix:  absent. Cuff is healing well  Uterus:  absent.          Assessment   1. She has improved from her inability to void.  However she is still drinking too much water in my opinion.  I would like her to avoid liquids for about 3 hours before she goes to sleep at night at 8 PM.  It appears that on some days she is avoiding over 3 L.  I have asked her to record the voided amounts and one column and the catheter was amounts on a new call and have changed the post void residual voiding diary sheets somewhat to make this simplified.  She voices understanding.     Plan   1. She may increase activities no intercourse; reassured her that things are improving and should continue to improve.  2. We will see her back in about 10 days to see how she is doing voiding.  I have given her a new voiding diary sheet hopefully will make more sense to her as far as where to record amounts she voided and were to record the amount she has to catheterize.  Have asked her to cut down on fluid intake as this is simply causing her to have to catheterize herself more go to the bathroom more often.  3. Will refill Macrobid and have her use that at night.    Medications Rx this encounter:  New Medications Ordered This Visit   Medications   • nitrofurantoin, macrocrystal-monohydrate, (MACROBID) 100 MG capsule     Sig: Take 1 capsule by mouth Every Night for 7 days. 1 po BID for 1 week     Dispense:  14 capsule     Refill:  1          This note was electronically  signed.    Chris Chavez MD  January 22, 2019

## 2019-02-01 ENCOUNTER — OFFICE VISIT (OUTPATIENT)
Dept: OBSTETRICS AND GYNECOLOGY | Facility: CLINIC | Age: 34
End: 2019-02-01

## 2019-02-01 VITALS
BODY MASS INDEX: 27.76 KG/M2 | RESPIRATION RATE: 16 BRPM | DIASTOLIC BLOOD PRESSURE: 70 MMHG | WEIGHT: 180 LBS | SYSTOLIC BLOOD PRESSURE: 116 MMHG

## 2019-02-01 DIAGNOSIS — Z09 SURGERY FOLLOW-UP: Primary | ICD-10-CM

## 2019-02-01 PROCEDURE — 99024 POSTOP FOLLOW-UP VISIT: CPT | Performed by: OBSTETRICS & GYNECOLOGY

## 2019-02-01 NOTE — PROGRESS NOTES
Subjective   Chief Complaint   Patient presents with   • Post-op     Doing better     Meagan Davis is a 33 y.o. year old  presenting to be seen for her post-operative visit.  She had a TLH and TVT.  The operative findings were reviewed.  Pathology report and operative pictures were reviewed if appropriate.  Currently she reports no problems with eating, bowel movements, voiding, or wound drainage and pain is well controlled.  She brings in her PDR sheet.  It is much improved.  She is drinking less water.  She had a couple episodes when she peed in the car so she stopped drinking as much water.  She is also converted to Sprite from caffeinated beverages and this is helped.  Upon review of her PVR record she is cathing much less the last few days.  And the total 24-hour amount is maybe 2400 mL instead of 3500 at her last visit.    She did have the flu and coughed but did not leak any urine.    The following portions of the patient's history were reviewed and updated as appropriate:current medications and allergies    Review of Systems : Please see above     Objective   /70   Resp 16   Wt 81.6 kg (180 lb)   LMP 2018   Breastfeeding? No   BMI 27.76 kg/m²     General:  well developed; well nourished  no acute distress  appears stated age   Abdomen: soft, non-tender; no masses  no umbilical or inguinal hernias are present  no hepato-splenomegaly  incision is clean, dry, intact and without drainage   Pelvis: Clinical staff was present for exam  External genitalia:  normal appearance of the external genitalia including Bartholin's and [place]'s glands.  :  urethral meatus normal; urethral hypermobility is absent.  Vaginal:  normal pink mucosa without prolapse or lesions. she is able to perform a Kegel contraction upon request;  Uterus:  absent.  Adnexa:  normal bimanual exam of the adnexa.          Assessment   1. Improved status post TLH/TVT less need for catheterization.  Discussed that I  would like her to continue time voiding, avoid liquids at night.  Only catheterize herself if she is feels full.  Limit no intercourse until we see her back in 2 weeks     Plan   1. As above follow-up 2 weeks for final postop checkup.  2. She should keep her cath supplies handy.    Medications Rx this encounter:  No orders of the defined types were placed in this encounter.         This note was electronically signed.    Chris Chavez MD  February 1, 2019

## 2019-02-12 ENCOUNTER — TELEPHONE (OUTPATIENT)
Dept: OBSTETRICS AND GYNECOLOGY | Facility: CLINIC | Age: 34
End: 2019-02-12

## 2019-02-15 ENCOUNTER — OFFICE VISIT (OUTPATIENT)
Dept: OBSTETRICS AND GYNECOLOGY | Facility: CLINIC | Age: 34
End: 2019-02-15

## 2019-02-15 VITALS
RESPIRATION RATE: 16 BRPM | WEIGHT: 183 LBS | SYSTOLIC BLOOD PRESSURE: 116 MMHG | BODY MASS INDEX: 28.22 KG/M2 | DIASTOLIC BLOOD PRESSURE: 70 MMHG

## 2019-02-15 DIAGNOSIS — Z09 S/P GYNECOLOGICAL SURGERY, FOLLOW-UP EXAM: Primary | ICD-10-CM

## 2019-02-15 PROCEDURE — 99024 POSTOP FOLLOW-UP VISIT: CPT | Performed by: OBSTETRICS & GYNECOLOGY

## 2019-02-15 NOTE — PROGRESS NOTES
Subjective   Chief Complaint   Patient presents with   • Post-op     doing good     Meaganwilliam Davis is a 33 y.o. year old  presenting to be seen for her post-operative visit.  She had a TLH and TVT.  She is now about 6 weeks postop.  At her last visit she was doing better regarding less need for self-catheterization.  Currently she reports no problems with eating, bowel movements, voiding, or wound drainage and pain is well controlled.  In the past 2 weeks she is only had a catheter self may be once or twice at the most.  I discussed that this is definitely an improvement from the first 2 weeks postop.  I would suggest that she keep the self cath equipment with her and take all medications/trips just in case she has some issues can avoid an emergency room trip for urinary retention.    The following portions of the patient's history were reviewed and updated as appropriate:problem list, current medications and allergies    Review of Systems : Please see above     Objective   /70   Resp 16   Wt 83 kg (183 lb)   LMP 2018   Breastfeeding? No   BMI 28.22 kg/m²     General:  well developed; well nourished  no acute distress  appears stated age   Abdomen: soft, non-tender; no masses  no umbilical or inguinal hernias are present  no hepato-splenomegaly  incision is healing, clean, dry, intact and without drainage   Pelvis: Clinical staff was present for exam  External genitalia:  normal appearance of the external genitalia including Bartholin's and [place]'s glands.  :  urethral meatus normal; urethral hypermobility is absent. Suburethral area is nontender to examination  Vaginal:  normal pink mucosa without prolapse or lesions.  Cervix:  absent. I can still palpate a few of the cuff sutures.  On speculum examination no discharge no bleeding noted.  Nontender  Uterus:  absent.  Adnexa:  normal bimanual exam of the adnexa.          Assessment   1. Doing well status post TLH bilateral salpingectomy  along with TVT for urinary incontinence.  2. Urinary retention is essentially resolved with only 1 or 2 episodes in the last couple of weeks.  Discussed that this could potentially occur in the future so she needs to keep self cath equipment handy just in case but overall she is very pleased with the outcome.       Plan   1. As above  2. We will see back for annual examination sooner if she has any problems.    Medications Rx this encounter:  No orders of the defined types were placed in this encounter.         This note was electronically signed.    Chris Chavez MD  February 15, 2019

## 2022-03-15 ENCOUNTER — TELEPHONE (OUTPATIENT)
Dept: OBSTETRICS AND GYNECOLOGY | Facility: CLINIC | Age: 37
End: 2022-03-15

## 2022-03-15 NOTE — TELEPHONE ENCOUNTER
Pt called and stated she had a partial hysterectomy performed by dr. Chavez. Pt wants to know if she still has her cervix.

## 2022-09-27 ENCOUNTER — OFFICE VISIT (OUTPATIENT)
Dept: OBSTETRICS AND GYNECOLOGY | Facility: CLINIC | Age: 37
End: 2022-09-27

## 2022-09-27 VITALS
DIASTOLIC BLOOD PRESSURE: 70 MMHG | RESPIRATION RATE: 16 BRPM | SYSTOLIC BLOOD PRESSURE: 118 MMHG | WEIGHT: 159 LBS | BODY MASS INDEX: 24.52 KG/M2

## 2022-09-27 DIAGNOSIS — Z01.411 ENCOUNTER FOR GYNECOLOGICAL EXAMINATION WITH ABNORMAL FINDING: Primary | ICD-10-CM

## 2022-09-27 DIAGNOSIS — A63.0 CONDYLOMA: ICD-10-CM

## 2022-09-27 PROBLEM — N99.89 POSTOPERATIVE URINARY RETENTION: Status: RESOLVED | Noted: 2019-01-14 | Resolved: 2022-09-27

## 2022-09-27 PROBLEM — R33.8 POSTOPERATIVE URINARY RETENTION: Status: RESOLVED | Noted: 2019-01-14 | Resolved: 2022-09-27

## 2022-09-27 PROBLEM — N39.3 SUI (STRESS URINARY INCONTINENCE, FEMALE): Status: RESOLVED | Noted: 2017-07-13 | Resolved: 2022-09-27

## 2022-09-27 PROBLEM — N94.10 DYSPAREUNIA, FEMALE: Status: RESOLVED | Noted: 2018-12-05 | Resolved: 2022-09-27

## 2022-09-27 PROCEDURE — 56501 DESTROY VULVA LESIONS SIM: CPT | Performed by: NURSE PRACTITIONER

## 2022-09-27 PROCEDURE — 99385 PREV VISIT NEW AGE 18-39: CPT | Performed by: NURSE PRACTITIONER

## 2022-09-27 RX ORDER — ESCITALOPRAM OXALATE 10 MG/1
TABLET ORAL
COMMUNITY
Start: 2022-07-29

## 2022-09-27 NOTE — PROGRESS NOTES
Annual Visit     Patient Name: Meagan Davis  : 1985   MRN: [id number]   Care Team: Patient Care Team:  Marvin Perez DO as PCP - General (Family Medicine)  Teressa Fisher APRN as Nurse Practitioner (Nurse Practitioner)    Chief Complaint:    Chief Complaint   Patient presents with   • Annual Exam     Would like treatment for genital warts       HPI: Meagan Davis is a 37 y.o. year old  presenting to be seen for her gynecologic exam - new pt.  Former Dr. Chavez pt - hasn't been seen since 2019     S/p total hyst with bilateral salpingectomy - ovaries remain   TVT also done - no urinary incontinence now     Hx genital warts - states she hasn't had recurrence since surgery in , until a few wks ago   She is currently going through a divorce after 17 yrs of marriage and has had a lot of added stress lately     MGM with hx of breast cancer in her late 70s     Smoker     Has been a pt of Dr. Chavez for many yrs       Subjective      /70   Resp 16   Wt 72.1 kg (159 lb)   LMP 2018   Breastfeeding No   BMI 24.52 kg/m²     BMI reviewed: Body mass index is 24.52 kg/m².      Objective     Physical Exam    Neuro: alert and oriented to person, place and time   General:  alert; cooperative; well developed; well nourished   Skin:  No suspicious lesions seen   Thyroid: normal to inspection and palpation   Lungs:  breathing is unlabored  clear to auscultation bilaterally   Heart:  regular rate and rhythm, S1, S2 normal, no murmur, click, rub or gallop  normal apical impulse   Breasts:  Examined in supine position  Symmetric without masses or skin dimpling  Nipples normal without inversion, lesions or discharge  There are no palpable axillary nodes   Abdomen: soft, non-tender; no masses  no umbilical or inguinal hernias are present  no hepato-splenomegaly   Pelvis: Clinical staff was present for exam  External genitalia:  2 flesh colored warts present on mons   :   urethral meatus normal;  Vaginal:  normal pink mucosa without prolapse or lesions. 3 genital warts present right side of introitus, 1 present at 6 o'clock at introitus  Cervix:  absent.  Uterus:  absent.  Adnexa:  normal bimanual exam of the adnexa.  Rectal:  digital rectal exam not performed; anus visually normal appearing.     TCA applied to 6 genital warts.  Petroleum jelly applied to surrounding tissue.  Pt tolerated well.      Assessment / Plan      Assessment  Problems Addressed This Visit    ICD-10-CM ICD-9-CM   1. Encounter for gynecological examination with abnormal finding  Z01.411 V72.31   2. Condyloma  A63.0 078.11       Plan    Discussed monthly SBEs   Start mammograms at age 39    No shower x 24 hrs unless skin irritation is bothersome, then may go ahead and shower   No sexual activity while condyloma present   F/u in 2 wks with rpt txment if needed     Discussed smoking cessation and HPV     AV 1 yr         19 to 39: Counseling/Anticipatory Guidance Discussed: sexual behavior and STDs and breast cancer and self breast exams    Follow Up  Return in about 2 weeks (around 10/11/2022) for Recheck.  Patient was given instructions and counseling regarding her condition or for health maintenance advice. Please see specific information pulled into the AVS if appropriate.     Teressa Fisher, APRN  September 27, 2022  11:45 EDT

## 2022-10-13 ENCOUNTER — OFFICE VISIT (OUTPATIENT)
Dept: OBSTETRICS AND GYNECOLOGY | Facility: CLINIC | Age: 37
End: 2022-10-13

## 2022-10-13 VITALS
SYSTOLIC BLOOD PRESSURE: 116 MMHG | BODY MASS INDEX: 24.06 KG/M2 | WEIGHT: 156 LBS | DIASTOLIC BLOOD PRESSURE: 70 MMHG | RESPIRATION RATE: 16 BRPM

## 2022-10-13 DIAGNOSIS — A63.0 CONDYLOMA: Primary | ICD-10-CM

## 2022-10-13 PROCEDURE — 56501 DESTROY VULVA LESIONS SIM: CPT | Performed by: NURSE PRACTITIONER

## 2022-10-13 NOTE — PROGRESS NOTES
Problem Visit     Patient Name: Meagan Davis  : 1985   MRN: [id number]   Care Team: Patient Care Team:  Marvin Perez DO as PCP - General (Family Medicine)  Teressa Fisher APRN as Nurse Practitioner (Nurse Practitioner)    Chief Complaint:    Chief Complaint   Patient presents with   • Follow-up     warts       HPI: Meagan Davis is a 37 y.o. year old  presenting to be seen for 2 wk f/u TCA txment.  6 genital warts total treated at LOV   Denies skin irritation or pain after txment     AV done 2022   S/p total hyst with Bilateral salpingectomy       Subjective      I have reviewed the patients family history, social history, past medical history, past surgical history and have updated it as appropriate.    /70   Resp 16   Wt 70.8 kg (156 lb)   LMP 2018   BMI 24.06 kg/m²     BMI reviewed: Body mass index is 24.06 kg/m².      Objective     Physical Exam      Neuro: alert and oriented to person, place and time   General:  alert; cooperative; well developed; well nourished   Skin:  Not performed.   Thyroid: not examined   Lungs:  breathing is unlabored   Heart:  Not performed.   Breasts:  Not performed.   Abdomen: Not performed.   Pelvis: Clinical staff was present for exam  External genitalia:  3 genital warts present right side at introitus      TCA applied to 3 genital warts.  Petroleum jelly applied to surrounding tissue.  Pt tolerated well.      Assessment / Plan      Assessment  Problems Addressed This Visit    ICD-10-CM ICD-9-CM   1. Condyloma  A63.0 078.11       Plan    TCA txment #2 applied to remaining condyloma today   No shower x 24 hrs unless skin irritation is bothersome, then may go ahead and shower   No sexual activity while condyloma present   F/u in 3 wks with rpt txment if needed   Discussed 3 genital warts have resolved now           Follow Up  Return in about 3 weeks (around 11/3/2022) for Recheck.  Patient was given instructions and  counseling regarding her condition or for health maintenance advice. Please see specific information pulled into the AVS if appropriate.     Teressa Fisher, APRN  October 13, 2022  13:27 EDT

## (undated) DEVICE — MANIP UTER RUMI TP 6.7MMX8CM BLU

## (undated) DEVICE — APPL CHLORAPREP W/TINT 26ML BLU

## (undated) DEVICE — SUT GUT PLN 3/0 FS2 27IN 1630H

## (undated) DEVICE — NDL HYPO ECLPS SFTY 22G 1 1/2IN

## (undated) DEVICE — GLV SURG SENSICARE MICRO PF LF 8 STRL

## (undated) DEVICE — OBT BLADLES ENDOWRIST DAVINCI/S 8MM

## (undated) DEVICE — Device

## (undated) DEVICE — GOWN,PREVENTION PLUS,XXLARGE,STERILE: Brand: MEDLINE

## (undated) DEVICE — ENCORE® LATEX MICRO SIZE 7, STERILE LATEX POWDER-FREE SURGICAL GLOVE: Brand: ENCORE

## (undated) DEVICE — SKIN AFFIX SURG ADHESIVE 72/CS 0.55ML: Brand: MEDLINE

## (undated) DEVICE — APL DUPLOSPRAYER MIS 40CM

## (undated) DEVICE — [HIGH FLOW INSUFFLATOR,  DO NOT USE IF PACKAGE IS DAMAGED,  KEEP DRY,  KEEP AWAY FROM SUNLIGHT,  PROTECT FROM HEAT AND RADIOACTIVE SOURCES.]: Brand: PNEUMOSURE

## (undated) DEVICE — PAD STEEP TRENDELENBURG W/RAIL STRAP INTEGR ARM PROTECT WING

## (undated) DEVICE — GLV SURG TRIUMPH CLASSIC PF LTX 8 STRL

## (undated) DEVICE — GOWN,NON-REINFORCED,SIRUS,SET IN SLV,XL: Brand: MEDLINE

## (undated) DEVICE — GLV SURG SIGNATURE TOUCH PF LTX 7.5 STRL BX/50

## (undated) DEVICE — DRP ADAPT ALLY UTER POSTN SYS 1P/U

## (undated) DEVICE — DRAPE,TOP,102X53,STERILE: Brand: MEDLINE

## (undated) DEVICE — TROCARS: Brand: KII® OPTICAL ACCESS SYSTEM

## (undated) DEVICE — DECANT BG O JET

## (undated) DEVICE — PK MAJ GYN DAVINCI 10

## (undated) DEVICE — OCCL COLPO PNEUMO  STRL

## (undated) DEVICE — INTENDED FOR TISSUE SEPARATION, AND OTHER PROCEDURES THAT REQUIRE A SHARP SURGICAL BLADE TO PUNCTURE OR CUT.: Brand: BARD-PARKER ® STAINLESS STEEL BLADES

## (undated) DEVICE — LEX VAGINAL HYSTERECTOMY: Brand: MEDLINE INDUSTRIES, INC.

## (undated) DEVICE — TIP COVER ACCESSORY

## (undated) DEVICE — ANTIBACTERIAL UNDYED BRAIDED (POLYGLACTIN 910), SYNTHETIC ABSORBABLE SUTURE: Brand: COATED VICRYL

## (undated) DEVICE — FLTR PLUMEPORT LAP W/CONN STRL

## (undated) DEVICE — ELECTRD NDL EDGE/INSUL/PFTE.787MM 2.84IN

## (undated) DEVICE — GLV SURG DERMASSURE GRN LF PF 7.0

## (undated) DEVICE — SUT MNCRYL PLS ANTIB UD 3/0 PS2 27IN

## (undated) DEVICE — ENDOCUT SCISSOR TIP, DISPOSABLE: Brand: RENEW

## (undated) DEVICE — COVER,LIGHT HANDLE,FLX,1/PK: Brand: MEDLINE INDUSTRIES, INC.

## (undated) DEVICE — SYR CONTRL LUERLOK 10CC

## (undated) DEVICE — SEAL CANN CAM ENDOWRIST DAVINCI/S 8.5MM

## (undated) DEVICE — CANNULA SEAL